# Patient Record
Sex: FEMALE | Race: BLACK OR AFRICAN AMERICAN | NOT HISPANIC OR LATINO | Employment: FULL TIME | URBAN - METROPOLITAN AREA
[De-identification: names, ages, dates, MRNs, and addresses within clinical notes are randomized per-mention and may not be internally consistent; named-entity substitution may affect disease eponyms.]

---

## 2021-10-24 ENCOUNTER — HOSPITAL ENCOUNTER (EMERGENCY)
Facility: CLINIC | Age: 31
Discharge: HOME | End: 2021-10-24
Attending: EMERGENCY MEDICINE | Admitting: EMERGENCY MEDICINE
Payer: OTHER GOVERNMENT

## 2021-10-24 ENCOUNTER — APPOINTMENT (OUTPATIENT)
Dept: CT IMAGING | Facility: CLINIC | Age: 31
End: 2021-10-24
Attending: EMERGENCY MEDICINE
Payer: OTHER GOVERNMENT

## 2021-10-24 VITALS
HEART RATE: 62 BPM | DIASTOLIC BLOOD PRESSURE: 82 MMHG | WEIGHT: 135 LBS | RESPIRATION RATE: 22 BRPM | SYSTOLIC BLOOD PRESSURE: 119 MMHG | TEMPERATURE: 98.2 F | OXYGEN SATURATION: 96 %

## 2021-10-24 DIAGNOSIS — U07.1 INFECTION DUE TO 2019 NOVEL CORONAVIRUS: ICD-10-CM

## 2021-10-24 DIAGNOSIS — R06.00 DYSPNEA, UNSPECIFIED TYPE: ICD-10-CM

## 2021-10-24 LAB
ANION GAP SERPL CALCULATED.3IONS-SCNC: 5 MMOL/L (ref 3–14)
BASOPHILS # BLD MANUAL: 0 10E3/UL (ref 0–0.2)
BASOPHILS NFR BLD MANUAL: 0 %
BUN SERPL-MCNC: 9 MG/DL (ref 7–30)
CALCIUM SERPL-MCNC: 9.3 MG/DL (ref 8.5–10.1)
CHLORIDE BLD-SCNC: 110 MMOL/L (ref 94–109)
CO2 SERPL-SCNC: 27 MMOL/L (ref 20–32)
CREAT SERPL-MCNC: 0.85 MG/DL (ref 0.52–1.04)
D DIMER PPP FEU-MCNC: 0.63 UG/ML FEU (ref 0–0.5)
EOSINOPHIL # BLD MANUAL: 0 10E3/UL (ref 0–0.7)
EOSINOPHIL NFR BLD MANUAL: 0 %
ERYTHROCYTE [DISTWIDTH] IN BLOOD BY AUTOMATED COUNT: 14.3 % (ref 10–15)
GFR SERPL CREATININE-BSD FRML MDRD: 90 ML/MIN/1.73M2
GLUCOSE BLD-MCNC: 82 MG/DL (ref 70–99)
HCT VFR BLD AUTO: 37.2 % (ref 35–47)
HGB BLD-MCNC: 12.6 G/DL (ref 11.7–15.7)
LYMPHOCYTES # BLD MANUAL: 1.7 10E3/UL (ref 0.8–5.3)
LYMPHOCYTES NFR BLD MANUAL: 62 %
MCH RBC QN AUTO: 27.2 PG (ref 26.5–33)
MCHC RBC AUTO-ENTMCNC: 33.9 G/DL (ref 31.5–36.5)
MCV RBC AUTO: 80 FL (ref 78–100)
MONOCYTES # BLD MANUAL: 0.1 10E3/UL (ref 0–1.3)
MONOCYTES NFR BLD MANUAL: 3 %
NEUTROPHILS # BLD MANUAL: 1 10E3/UL (ref 1.6–8.3)
NEUTROPHILS NFR BLD MANUAL: 35 %
PLAT MORPH BLD: ABNORMAL
PLATELET # BLD AUTO: 187 10E3/UL (ref 150–450)
POTASSIUM BLD-SCNC: 3.6 MMOL/L (ref 3.4–5.3)
RBC # BLD AUTO: 4.63 10E6/UL (ref 3.8–5.2)
RBC MORPH BLD: ABNORMAL
SODIUM SERPL-SCNC: 142 MMOL/L (ref 133–144)
TARGETS BLD QL SMEAR: ABNORMAL
WBC # BLD AUTO: 2.8 10E3/UL (ref 4–11)

## 2021-10-24 PROCEDURE — 99285 EMERGENCY DEPT VISIT HI MDM: CPT | Mod: 25

## 2021-10-24 PROCEDURE — 85027 COMPLETE CBC AUTOMATED: CPT | Performed by: EMERGENCY MEDICINE

## 2021-10-24 PROCEDURE — 250N000009 HC RX 250: Performed by: EMERGENCY MEDICINE

## 2021-10-24 PROCEDURE — 36415 COLL VENOUS BLD VENIPUNCTURE: CPT | Performed by: EMERGENCY MEDICINE

## 2021-10-24 PROCEDURE — 71275 CT ANGIOGRAPHY CHEST: CPT

## 2021-10-24 PROCEDURE — 85379 FIBRIN DEGRADATION QUANT: CPT | Performed by: EMERGENCY MEDICINE

## 2021-10-24 PROCEDURE — 250N000011 HC RX IP 250 OP 636: Performed by: EMERGENCY MEDICINE

## 2021-10-24 PROCEDURE — 80048 BASIC METABOLIC PNL TOTAL CA: CPT | Performed by: EMERGENCY MEDICINE

## 2021-10-24 RX ORDER — IOPAMIDOL 755 MG/ML
500 INJECTION, SOLUTION INTRAVASCULAR ONCE
Status: COMPLETED | OUTPATIENT
Start: 2021-10-24 | End: 2021-10-24

## 2021-10-24 RX ADMIN — IOPAMIDOL 93 ML: 755 INJECTION, SOLUTION INTRAVENOUS at 15:35

## 2021-10-24 RX ADMIN — SODIUM CHLORIDE 77 ML: 9 INJECTION, SOLUTION INTRAVENOUS at 15:35

## 2021-10-24 ASSESSMENT — ENCOUNTER SYMPTOMS
VOMITING: 0
NAUSEA: 0
SHORTNESS OF BREATH: 1
DIARRHEA: 0

## 2021-10-24 NOTE — ED TRIAGE NOTES
A&O x4.  ABC's intact.      Pt arrives with c/o SOB that started Friday- tested positive for COVID.  Was prescribed inhaler every 123 hours.

## 2021-10-24 NOTE — ED NOTES
Patient eligible for discharge. Patient education including medication administration and follow-up care completed. Patient/representative verbalized understanding of education. Patient/representative able to preform teach back education. All questions answered and concerns addressed. IV removed. All belongings sent home with patient/representative. Patient vitally stable at time of discharge.

## 2021-10-24 NOTE — ED PROVIDER NOTES
History   Chief Complaint:  COVID-19 Concern     The history is provided by the patient.      Radha Bojorquez is a 31 year old female with a history of traumatic pneumothorax, anemia, and depression who presents for evaluation of shortness of breath in the setting of known COVID-19 infection. The patient states that her initial symptoms started on 10/16/21 while she was in [place]. Over the next few days, she lost her sense of taste and smell prompting [place] [facility] [place] and COVID-19 testing that returned positive. She was prescribed an inhaler to use every 1-3 hours. She started to feel shortness of breath on Friday which has worsened over the past two days. She denies nausea, vomiting, or diarrhea.     Review of Systems   HENT:        Loss of taste and smell   Respiratory: Positive for shortness of breath.    Gastrointestinal: Negative for diarrhea, nausea and vomiting.   All other systems reviewed and are negative.    Allergies:  The patient has no known allergies.     Medications:  Ferrous Sulfate  Vitamin D     Past Medical History:    Depression  Postpartum depression  High risk pregnancy  Anemia   Hemoglobin C trait   Umbilical hernia without obstruction  Pneumothorax, traumatic  Kidney infection  UTI      Past Surgical History:    Hernia repair     Family History:    Hypertension - father  Migraines - mother   Sickle cell anemia - daughter    Social History:  Presents to the ED: alone  Current Some Day Smoker    Physical Exam     Patient Vitals for the past 24 hrs:   BP Temp Temp src Pulse Resp SpO2 Weight   10/24/21 1600 119/82 -- -- 62 -- 96 % --   10/24/21 1552 -- -- -- -- -- -- 61.2 kg (135 lb)   10/24/21 1530 -- -- -- -- -- 100 % --   10/24/21 1515 (!) 128/94 -- -- 57 -- 100 % --   10/24/21 1514 -- -- -- -- -- 100 % --   10/24/21 1500 (!) 122/107 -- -- 71 -- 100 % --   10/24/21 1445 (!) 124/102 -- -- 65 -- 100 % --   10/24/21 1430 124/82 -- -- 63 -- 100 % --   10/24/21 1415 105/74 -- -- -- -- -- --    10/24/21 1349 124/85 98.2  F (36.8  C) Temporal 74 22 100 % --       Physical Exam  Constitutional: Vital signs reviewed as above.   Head: No external signs of trauma noted.  Eyes: Pupils are equal, round, and reactive to light.   Neck: No JVD noted  Cardiovascular: Normal rate, regular rhythm and normal heart sounds.  No murmur heard. Equal B/L peripheral pulses.  Pulmonary/Chest: Effort normal and breath sounds normal. No respiratory distress. Patient has no wheezes. Patient has no rales.   Gastrointestinal: Soft. There is no tenderness.   Musculoskeletal/Extremities: No edema noted. Normal tone.  Neurological: Patient is alert and oriented to person, place, and time.   Skin: Skin is warm and dry. There is no diaphoresis noted.   Psychiatric: The patient appears calm.    Emergency Department Course     Imaging:  CT Chest Pulmonary Embolism with Contrast  1. No pulmonary emboli. 2. Mild groundglass opacities in the lower lobes bilaterally consistent with pneumonia.     Report per radiology    Laboratory:  CBC: WBC: 2.8 (L), HGB: 12.6, PLT: 187    BMP: Chloride: 110 (H), o/w WNL (Creatinine: 0.85, Glucose 82,)    D-dimer: 0.63 (H)    Emergency Department Course:    Reviewed:  I reviewed the patient's nursing notes, vitals and past medical history.     Assessments/Consults:    ED Course as of Oct 24 2318   Sun Oct 24, 2021   1420 I performed an exam of the patient in room ED08 as documented above.      1600 Rechecked and updated. Discharge instructions and reasons to return discussed.        Disposition:  The patient was discharged to home.     Impression & Plan     CMS Diagnoses: None    Medical Decision Making:  Radha Bojorquez is a 31 year old female who presents to the emergency department today for evaluation of shortness of breath.  Please see the HPI and exam for specifics.  Fortunately, the patient was not hypoxic.  Based on her recent travel, blood work including D-dimer was ordered.  This led to a CT  chest which fortunately, did not demonstrate PE though there are findings consistent with COVID-19.  The patient was ultimately able to be discharged with instructions for outpatient follow-up as well as a pulse oximeter.  She should return with any new or worsening symptoms.  Anticipatory guidance given prior to discharge.        Covid-19  Radha Bojorquez was evaluated during a global COVID-19 pandemic, which necessitated consideration that the patient might be at risk for infection with the SARS-CoV-2 virus that causes COVID-19.   Applicable protocols for evaluation were followed during the patient's care.   COVID-19 was considered as part of the patient's evaluation. The plan for testing is:  a test was obtained at a previous visit which was reviewed & considered today.    Diagnosis:    ICD-10-CM    1. Dyspnea, unspecified type  R06.00    2. Infection due to 2019 novel coronavirus  U07.1 COVID-19 [facility] Referral     Care Coordination Referral     Scribe Disclosure:  I, Anita Quintana, am serving as a scribe at 2:10 PM on 10/24/2021 to document services personally performed by Abilio Jose DO based on my observations and the provider's statements to me.    This note was completed in part using Dragon voice recognition software. Although reviewed after completion, some word and grammatical errors may occur.      Abilio Jose DO  10/24/21 [id number]

## 2021-10-25 ENCOUNTER — PATIENT OUTREACH (OUTPATIENT)
Dept: CARE COORDINATION | Facility: CLINIC | Age: 31
End: 2021-10-25

## 2021-10-25 NOTE — PROGRESS NOTES
"Care Coordination Hospital/ED Discharge Follow up Note    Hospital/ED Discharge date: 10/24/21    Reason/Diagnosis for Hospital/ED visit: COVID-19, SOB    Are you feeling better, the same, or worse since your Hospital/ED visit? unchanged    Symptoms:     Cough - Coughs more with moving around, and now experiencing pain in her throat when coughing, which she didn't have previously.  RN recommended Mucinex to help thin/loosen secretions, honey.    Shortness of breath:  shortness of breath with activity.  She has been using the IS, nurse set it to 1000 and pt states she's been able to reach that number.  Pt states she has been taking Pulmicort flexhaler twice a day, as prescribed from [facility] [place] Friday, 10/22/21, when she was seen for CP.  RN encouraged continued use of her IS every 1-2 hours WA, as well as the Pulmicort inhaler.    Chest pain:  Yes: \"a little, the same as it was yesterday when seen in the ER, but worse than it was when seen at [facility] on Friday.\"      Pt states she has a history of anxiety with chest pain, which was pre-COVID, and with now having COVID, she believes there is a \"fear factor\" and having some difficulty determining if the chest discomfort is from her anxiety (which she states is always worse when talking about \"something serious\") vs actual worsening CP from her COVID infection.  Pt reports when her anxiety is bad, she shuts the T.V. off, and goes into a quiet place where she can look out her window and do deep breathing exercises, which has worked well in bringing her anxiety under control; she has tried this a few times since being dx with COVID and has found it helpful.  She is concerned about when to be seen in the ER, and how to determine when to be seen.  RN spent a lot of time listening and talking with pt about her anxiety, and ways to help cope when anxiety increases including writing down how she's feeling, keeping a journal, talking about it with someone she trusts (pt states " "she doesn't talk about her anxiety but with a few people at her anxiety gets worse when talking too much about things).  Advised pt continue using the same pre-COVID anxiety strategies she's used with success in the past, and if symptoms persist despite her usual methods to calm her anxiety, and or the chest pain is severe, she should return to the ER/Urgent Care, and or call the nurse triage line to help determine if she should be seen in the ER/UC.  Also advised if her symptoms are concerning/severe, she can always be seen at any time.  Pt states she has the triage nurse number provided by the ER last night, and appreciative of talking through this during call today.  RN also informed pt she can ask for assistance or send messages to the RNs through [employer] Loop as well, which pt has already activated and participating in.  RN also changed the loop from ER to COVID, for closer monitoring over the next 2 weeks.      Fever: No    Current temperature:  98.3 (Forehead scanner)    Headache:  Yes \"slight\"    Sore throat:  No, only with coughing    Nasal congestion:  No    Nausea/vomiting/diarrhea:  Yes, felt nauseous this morning but it has since gone away.    Body aches/joint pains:  No    Fatigue:  No, but having difficulty sleeping.  Took ZzzQuil last night, which helped.  RN informed pt she can try Melatonin as well to help with insomnia.    Home treatment measures used and outcome:  Tylenol Regular Strength 1 tab once a day but last night took 2 ibuprofen instead of the Tylenol as she had a HA.  TheraFlu twice a day (morning & night), ZzzQuil, Vitamin C, Tumeric, Elderberry, and Apple Cider Vinegar.  RN informed pt she can also take Vitamin D and Zinc per the directions on the bottle.  Advised to monitor Tylenol intake, max dose of 3,000 mg per 24 hours.    Pulse Oximeter/Oxygen Questions:    Were you sent home with a pulse oximeter?  Yes    Are you currently utilizing the pulse oximeter?  Yes    Do you understand " how to use the home oximeter?  Yes    What are your current oxygen saturation levels?  lowest reading has been 90%, highest reading 98% a few minutes ago    Oxygen saturation levels in ED/IP:  %    Were you sent home with home oxygen?  No      Medications:    Were you prescribed any new medications?  No    Follow Up:    Do you have a follow up appointment scheduled with your PCP or specialist?  No, RN transferred pt to .  Pt scheduled for video visit with provider on 10/27/21.    Do you have a plan in place in the event of an emergency?  Yes    If patient is established or planning to establish primary care within [facility], Care Coordination was offered. Care Coordination accepted/declined:  No    GetWell Loop invitation received?  Yes    GetWell Loop invitation accepted, pending patient activation or declined:   Accepted, louis clarke RN changed loop from ER to COVID for closer monitoring          Ria Shen, JULIETTE  [facility] Care Coordination

## 2021-10-26 ENCOUNTER — APPOINTMENT (OUTPATIENT)
Dept: GENERAL RADIOLOGY | Facility: CLINIC | Age: 31
End: 2021-10-26
Attending: EMERGENCY MEDICINE
Payer: OTHER GOVERNMENT

## 2021-10-26 ENCOUNTER — HOSPITAL ENCOUNTER (EMERGENCY)
Facility: CLINIC | Age: 31
Discharge: HOME | End: 2021-10-26
Attending: EMERGENCY MEDICINE | Admitting: EMERGENCY MEDICINE
Payer: OTHER GOVERNMENT

## 2021-10-26 VITALS
SYSTOLIC BLOOD PRESSURE: 109 MMHG | TEMPERATURE: 99.1 F | HEART RATE: 76 BPM | RESPIRATION RATE: 30 BRPM | DIASTOLIC BLOOD PRESSURE: 86 MMHG | OXYGEN SATURATION: 100 %

## 2021-10-26 DIAGNOSIS — R10.13 ABDOMINAL PAIN, EPIGASTRIC: ICD-10-CM

## 2021-10-26 DIAGNOSIS — U07.1 INFECTION DUE TO 2019 NOVEL CORONAVIRUS: ICD-10-CM

## 2021-10-26 LAB
ALBUMIN SERPL-MCNC: 4.1 G/DL (ref 3.4–5)
ALP SERPL-CCNC: 63 U/L (ref 40–150)
ALT SERPL W P-5'-P-CCNC: 15 U/L (ref 0–50)
ANION GAP SERPL CALCULATED.3IONS-SCNC: 7 MMOL/L (ref 3–14)
AST SERPL W P-5'-P-CCNC: 16 U/L (ref 0–45)
BASOPHILS # BLD AUTO: 0 10E3/UL (ref 0–0.2)
BASOPHILS NFR BLD AUTO: 0 %
BILIRUB DIRECT SERPL-MCNC: 0.2 MG/DL (ref 0–0.2)
BILIRUB SERPL-MCNC: 0.7 MG/DL (ref 0.2–1.3)
BUN SERPL-MCNC: 13 MG/DL (ref 7–30)
CALCIUM SERPL-MCNC: 9.2 MG/DL (ref 8.5–10.1)
CHLORIDE BLD-SCNC: 110 MMOL/L (ref 94–109)
CO2 SERPL-SCNC: 24 MMOL/L (ref 20–32)
CREAT SERPL-MCNC: 0.93 MG/DL (ref 0.52–1.04)
EOSINOPHIL # BLD AUTO: 0 10E3/UL (ref 0–0.7)
EOSINOPHIL NFR BLD AUTO: 1 %
ERYTHROCYTE [DISTWIDTH] IN BLOOD BY AUTOMATED COUNT: 14.1 % (ref 10–15)
GFR SERPL CREATININE-BSD FRML MDRD: 81 ML/MIN/1.73M2
GLUCOSE BLD-MCNC: 96 MG/DL (ref 70–99)
HCT VFR BLD AUTO: 37.7 % (ref 35–47)
HGB BLD-MCNC: 12.6 G/DL (ref 11.7–15.7)
HOLD SPECIMEN: NORMAL
IMM GRANULOCYTES # BLD: 0 10E3/UL
IMM GRANULOCYTES NFR BLD: 0 %
LIPASE SERPL-CCNC: 68 U/L (ref 73–393)
LYMPHOCYTES # BLD AUTO: 1.3 10E3/UL (ref 0.8–5.3)
LYMPHOCYTES NFR BLD AUTO: 30 %
MCH RBC QN AUTO: 27.2 PG (ref 26.5–33)
MCHC RBC AUTO-ENTMCNC: 33.4 G/DL (ref 31.5–36.5)
MCV RBC AUTO: 81 FL (ref 78–100)
MONOCYTES # BLD AUTO: 0.2 10E3/UL (ref 0–1.3)
MONOCYTES NFR BLD AUTO: 6 %
NEUTROPHILS # BLD AUTO: 2.7 10E3/UL (ref 1.6–8.3)
NEUTROPHILS NFR BLD AUTO: 63 %
NRBC # BLD AUTO: 0 10E3/UL
NRBC BLD AUTO-RTO: 0 /100
NT-PROBNP SERPL-MCNC: 17 PG/ML (ref 0–450)
PLATELET # BLD AUTO: 192 10E3/UL (ref 150–450)
POTASSIUM BLD-SCNC: 3.5 MMOL/L (ref 3.4–5.3)
PROT SERPL-MCNC: 8.4 G/DL (ref 6.8–8.8)
RBC # BLD AUTO: 4.63 10E6/UL (ref 3.8–5.2)
SODIUM SERPL-SCNC: 141 MMOL/L (ref 133–144)
TROPONIN I SERPL-MCNC: <0.015 UG/L (ref 0–0.04)
WBC # BLD AUTO: 4.2 10E3/UL (ref 4–11)

## 2021-10-26 PROCEDURE — 85025 COMPLETE CBC W/AUTO DIFF WBC: CPT | Performed by: EMERGENCY MEDICINE

## 2021-10-26 PROCEDURE — 250N000009 HC RX 250: Performed by: EMERGENCY MEDICINE

## 2021-10-26 PROCEDURE — 82248 BILIRUBIN DIRECT: CPT | Performed by: EMERGENCY MEDICINE

## 2021-10-26 PROCEDURE — 83880 ASSAY OF NATRIURETIC PEPTIDE: CPT | Performed by: EMERGENCY MEDICINE

## 2021-10-26 PROCEDURE — 83690 ASSAY OF LIPASE: CPT | Performed by: EMERGENCY MEDICINE

## 2021-10-26 PROCEDURE — 36415 COLL VENOUS BLD VENIPUNCTURE: CPT | Performed by: EMERGENCY MEDICINE

## 2021-10-26 PROCEDURE — 99284 EMERGENCY DEPT VISIT MOD MDM: CPT | Mod: 25

## 2021-10-26 PROCEDURE — 80048 BASIC METABOLIC PNL TOTAL CA: CPT | Performed by: EMERGENCY MEDICINE

## 2021-10-26 PROCEDURE — 250N000013 HC RX MED GY IP 250 OP 250 PS 637: Performed by: EMERGENCY MEDICINE

## 2021-10-26 PROCEDURE — 71045 X-RAY EXAM CHEST 1 VIEW: CPT

## 2021-10-26 PROCEDURE — 84484 ASSAY OF TROPONIN QUANT: CPT | Performed by: EMERGENCY MEDICINE

## 2021-10-26 RX ORDER — ONDANSETRON 4 MG/1
4 TABLET, ORALLY DISINTEGRATING ORAL EVERY 6 HOURS PRN
Qty: 12 TABLET | Refills: 0 | Status: SHIPPED | OUTPATIENT
Start: 2021-10-26 | End: 2022-03-14

## 2021-10-26 RX ORDER — PANTOPRAZOLE SODIUM 40 MG/1
40 TABLET, DELAYED RELEASE ORAL DAILY
Qty: 30 TABLET | Refills: 0 | Status: SHIPPED | OUTPATIENT
Start: 2021-10-26 | End: 2023-08-17

## 2021-10-26 RX ORDER — LORAZEPAM 1 MG/1
1 TABLET ORAL 3 TIMES DAILY PRN
Qty: 8 TABLET | Refills: 0 | Status: SHIPPED | OUTPATIENT
Start: 2021-10-26 | End: 2023-08-17

## 2021-10-26 RX ORDER — PROCHLORPERAZINE MALEATE 10 MG
10 TABLET ORAL EVERY 8 HOURS PRN
Qty: 14 TABLET | Refills: 0 | Status: SHIPPED | OUTPATIENT
Start: 2021-10-26 | End: 2022-03-14

## 2021-10-26 RX ADMIN — LIDOCAINE HYDROCHLORIDE 30 ML: 20 SOLUTION ORAL; TOPICAL at 15:16

## 2021-10-26 ASSESSMENT — ENCOUNTER SYMPTOMS
ABDOMINAL PAIN: 1
NAUSEA: 1
VOMITING: 0
SHORTNESS OF BREATH: 0

## 2021-10-26 NOTE — DISCHARGE INSTRUCTIONS
Discharge Instructions  COVID-19    COVID-19 is the disease caused by a new coronavirus. The virus spreads from person-to-person primarily by droplets when an infected person coughs or sneezes and the droplets are then breathed in by another person. There are tests available to diagnose COVID-19. You may have been diagnosed with COVID, may be being tested for COVID and have a pending test result, or may have been exposed to COVID.    Symptoms of COVID-19  Many people have no symptoms or mild symptoms.  Symptoms may usually appear 4 to 5 days (up to 14 days) after contact with a person with COVID-19. Some people will get severe symptoms and pneumonia. Usual symptoms are:     ? Fever  ? Cough  ? Trouble breathing    Less common symptoms are: Headache, body aches, sore throat, sneezing, diarrhea, loss of taste or smell.    Isolation and Quarantine    You may have been seen because you have symptoms, had an exposure, or had some other concern about possible COVID. The best way to stop the spread of the virus is to avoid contact with others.    Isolation refers to sick people staying away from people who are not sick. A person in quarantine is limiting activity because they were exposed and are waiting to see if they might become sick.    If you test positive for COVID, you should stay home (isolation) for at least 10 days after your symptoms began, and for 24 hours with no fever and improvement of symptoms--whichever is longer. (Your fever should be gone for 24 hours without using fever-reducing medicine). If you have no symptoms, you should stay home (isolation) for 10 days from the day of the test. If you have been vaccinated for COVID, the vaccination will not cause you to test positive so a positive test result generally is a  true positive .    For example, if you have a fever and cough for 6 days, you need to stay home 4 more days with no fever for a total of 10 days. Or, if you have a fever and cough  for 10 days, you need to stay home one more day with no fever for a total of 11 days.    If you have a high-risk exposure to COVID (you spent 15 minutes or more within six feet of somebody who has COVID), you should stay home (quarantine) for 14 days, unless you are vaccinated. Even if you test negative for COVID, the CDC recommends a 14-day quarantine from the time of your last exposure to that individual (unless you are vaccinated). There are options for a shortened (<14 day quarantine) you can review at:  [email or web address]./diseases/coronavirus/close.html#long    If you live in the same house as somebody with COVID and cannot separate from them, you will need to quarantine for 14-days after that person's isolation (infectious) period. That means that you may need to quarantine for 24-days after that person became symptomatic/ill.    If you are vaccinated and do not develop symptoms, you do not need to quarantine after exposure.    If you have symptoms but a negative test, you should stay at home until you are symptom-free and without fever for 24 hours, using the same judgment you would for when it is safe to return to work/school from strep throat, influenza, or the common cold. If you worsen, you should consider being re-evaluated.    If you are being tested for COVID because of symptoms and your test is pending, you should stay home until you know your test result.    If I have COVID, how should I protect myself and others?    Do not go to work or school. Have a friend or relative do your shopping. Do not use public transportation (bus, train) or ridesharing (Lyft, Uber).    Separate yourself from other people in your home. As much as possible, you should stay in one room and away from other people in your home. Also, use a separate bathroom, if possible. Avoid handling pets or other animals while sick.     Wear a facemask if you need to be around other people and cover your mouth and nose with a  tissue when you cough or sneeze.     Avoid sharing personal household items. You should not share dishes, drinking glasses, forks/knives/spoons, towels, or bedding with other people in your home. After using these items, they should be washed with soap and water. Clean parts of your home that are touched often (doorknobs, faucets, countertops, etc.) daily.     Wash your hands often with soap and water for at least 20 seconds or use an alcohol-based hand  containing at least 60% alcohol.     Avoid touching your face.    Treat your symptoms. You can take Acetaminophen (Tylenol) to treat body aches and fever as needed for comfort. Ibuprofen (Advil or Motrin) can be used as well if you still have symptoms after taking Tylenol. Drink fluids. Rest.    Watch for worsening symptoms such as shortness of breath/difficulty breathing or very severe weakness.    Employers/workplaces are being asked by the Centers for Disease Control (CDC) to not request notes/documentation for you to return to work or prove that you were ill. You may choose to show your employer this paperwork. Also, repeat testing should not be required to return to work.    Exercise/Sports in rare cases, COVID could affect your heart in a way that makes exercise or participation in sports dangerous.    If you have a mild COVID illness (fever, cough, sore throat, and similar symptoms but no difficulty breathing or abnormalities of the lung): After your COVID symptoms have resolved, wait 14-days before returning to activity.  If you have more than a mild illness (meaning that you have problems with your breathing or lungs) or if you participate in competitive or strenuous activity or have a history of heart disease: Please see your primary doctor/provider prior to return to activity/competition.    Antibody treatments are available for patients with mild to moderate COVID illness in order to prevent severe illness. In general, only patients with risk  factors for severe illness are eligible for treatment. For more information, to see if you are eligible, and to find treatment, go to the [facility]:  [email or web address]/[email or web address].html     Return to the Emergency Department if:    If you are developing worsening breathing, shortness of breath, or feel worse you should seek medical attention.  If you are uncertain, contact your health care provider/clinic. If you need emergency medical attention, call 911 and tell them you have been ill.

## 2021-10-26 NOTE — ED PROVIDER NOTES
History   Chief Complaint:  Shortness of Breath    The history is provided by the patient.     Radha Bojorquez is a 31 year old female with history of anemia and pneumothorax who presents for evaluation of shortness of breath. The patient was seen here 2 days ago after being COVID positive as of 8 days ago. She was evaluated by shortness of breath. She had the below workup performed and was sent home after being enrolled in the get well loop.     She returns today because she was checking her O2 sats at home with the pulse ox given to her from the get well loop program and it read as 80%. She reports that earlier today she had some new abdominal pain and was still having her shortness of breath. She called her get well loop representative because of this and then took her pulse ox as above. She states she did drop the pulse ox at home and she is worried this may have effected the reading. She states she does not feel short of breath at this time but was concerned for the low O2 reading. She states her anxiety has been feeling increased today and she notes that she is having a hard time differentiating the anxiety from her symptoms. She rates her abdominal pain as a 5/10 at this time and states it feels different form her typical hernia pain. She notes some mild nausea while in the ER today but nothing at home. She denies any vomiting and any other known symptoms or concerns at this time. It is of note that pulse Ox at home reading 80% while on monitor in the ED she is reading 100%.     Workup from 10/24/21 at [facility] ED:  CT Chest Pulmonary Embolism with Contrast  1. No pulmonary emboli. 2. Mild groundglass opacities in the lower lobes bilaterally consistent with pneumonia. Report per radiology     CBC: WBC: 2.8 (L), HGB: 12.6, PLT: 187  BMP: Chloride: 110 (H), o/w WNL (Creatinine: 0.85, Glucose 82,)  D-dimer: 0.63 (H)    Review of Systems   Respiratory: Negative for shortness of breath.    Cardiovascular: Positive  for chest pain.   Gastrointestinal: Positive for abdominal pain and nausea. Negative for vomiting.   All other systems reviewed and are negative.      Allergies:  The patient has no known allergies.     Medications:  Ferrous Sulfate  Vitamin D     Past Medical History:    Depression  Postpartum depression  High risk pregnancy  Anemia   Hemoglobin C trait   Umbilical hernia without obstruction  Pneumothorax, traumatic  Kidney infection  UTI      Past Surgical History:    Hernia repair      Family History:    Hypertension   Migraines    Social History:  The patient presents to the ED alone.  Tobacco Use: Yes  Alcohol Use: No  Drug Use: No    Physical Exam     Patient Vitals for the past 24 hrs:   BP Temp Temp src Pulse Resp SpO2   10/26/21 1515 116/85 -- -- 72 -- 97 %   10/26/21 1500 (!) 107/91 -- -- 76 -- 100 %   10/26/21 1445 119/84 -- -- 77 -- 96 %   10/26/21 1311 116/79 99.1  F (37.3  C) Temporal 95 30 100 %       Physical Exam  Constitutional: Well developed, nontox appearance  Head: Atraumatic.   Neck:  no stridor  Eyes: no scleral icterus  Cardiovascular: RRR, 2+ bilat radial pulses  Pulmonary/Chest: intermittently coughing, nml resp effort, Clear BS bilat   Abdominal: ND, soft, epigastric abd tendernes, no rebound or guarding   Ext: Warm, well perfused, no edema  Neurological: A&O, symmetric facies, moves ext x4  Skin: Skin is warm and dry.   Psychiatric: Behavior is normal. Thought content normal.   Nursing note and vitals reviewed.    Emergency Department Course   Imaging:  XR Chest Port 1 View:  No acute disease. Report per radiology     Laboratory:  CBC: WBC 4.2, HGB 12.6,     BMP: Cl 110 (H), o/w WNL (Creat 0.93)    HFP: Pending at sign out    BNP: 17    Troponin: <0.015     Lipase: Pending at sign out    Emergency Department Course:  Reviewed:  I reviewed nursing notes, vitals, past medical history and care everywhere    Assessments:  1434 I performed a physical exam of the patient. Findings as  above.     1610 Patient rechecked and updated. Plan of care discussed and questions answered.     Interventions:  1516 Maalox 30 mL oral    Disposition:  The patient was signed out to  pending labs.     Impression & Plan   Covid-19  Radha Bojorquez was evaluated during a global COVID-19 pandemic, which necessitated consideration that the patient might be at risk for infection with the SARS-CoV-2 virus that causes COVID-19.   Applicable protocols for evaluation were followed during the patient's care.    Medical Decision Makin year old female presenting w/ cough, known Covid, epigastric abdominal pain    DDx includes viral syndrome NOS, influenza-like illness, influenza, COVID-19, hepatitis, pancreatitis, gastritis, GERD.  Doubt ACS, vascular catastrophe such as AAA or dissection given history and physical exam.  Labs significant for no remarkable abnormality, COVID-19 test not repeated given known positive.  Imaging sig for no acute cardiopulmonary disease although CT from 10/24/2021 reviewed revealed no PE at that time.  Interventions as noted above with improvement after GI cocktail.  Given relatively reassuring abdominal exam and mild improvement with GI cocktail, do not feel advanced imaging of the abdomen is indicated at this time.  Doubt hollow viscus perforation, intra-abdominal infection or surgical etiology.  Patient is likely experiencing gastritis and nausea secondary to Covid.  Prescriptions written as noted below for symptom control.  Patient's pulse ox was noted to be normal in the emergency department.  It was compared to her home pulse ox which read falsely low O2 saturations.  She was given a new pulse oximeter.  At this time I feel the pt is safe for discharge.  Recommendations given regarding follow up with PCP and return to the emergency department as needed for new or worsening symptoms.  Pt counseled on all results, disposition and diagnosis.  They are understanding and agreeable  to plan. Patient discharged in stable condition.      Diagnosis:    ICD-10-CM    1. Infection due to 2019 novel coronavirus  U07.1    2. Abdominal pain, epigastric  R10.13        Discharge Medications:  New Prescriptions    No medications on file     Scribe Disclosure:  I, [place] Teresa, am serving as a scribe at 1:59 PM on 10/26/2021 to document services personally performed by Eben Massey MD based on my observations and the provider's statements to me.     [facility]         Eben Massey MD  10/26/21 [id number]

## 2021-10-27 ENCOUNTER — VIRTUAL VISIT (OUTPATIENT)
Dept: INTERNAL MEDICINE | Facility: CLINIC | Age: 31
End: 2021-10-27
Payer: OTHER GOVERNMENT

## 2021-10-27 DIAGNOSIS — U07.1 INFECTION DUE TO 2019 NOVEL CORONAVIRUS: Primary | ICD-10-CM

## 2021-10-27 PROCEDURE — 99203 OFFICE O/P NEW LOW 30 MIN: CPT | Mod: 95 | Performed by: NURSE PRACTITIONER

## 2021-10-27 NOTE — PROGRESS NOTES
Radha is a 31 year old who is being evaluated via a billable video visit.      How would you like to obtain your AVS? MyChart  If the video visit is dropped, the invitation should be resent by: Text to cell phone: [phone]  Will anyone else be joining your video visit? No  Video Start Time: 1105    Assessment & Plan     Infection due to 2019 novel coronavirus        Monitor O2 sats, RTC if not improving         Tobacco Cessation:   reports that she has been smoking cigarettes. She has never used smokeless tobacco.          Yamile Enriquez NP  [facility]    Devendra Garcia is a 31 year old who presents for the following health issues     HPI     ED/UC Followup:    Facility:  [facility] ED  Date of visit: 10/26/21  Reason for visit: Covid  Current Status: O2 sats %, afebrile, no cough, ST.           Review of Systems   CONSTITUTIONAL: NEGATIVE for fever, chills, change in weight  ENT/MOUTH: NEGATIVE for ear, mouth and throat problems  RESP: NEGATIVE for significant cough or SOB  CV: NEGATIVE for chest pain, palpitations or peripheral edema      Objective           Vitals:  No vitals were obtained today due to virtual visit.    Physical Exam   GENERAL: Healthy, alert and no distress  EYES: Eyes grossly normal to inspection.  No discharge or erythema, or obvious scleral/conjunctival abnormalities.  RESP: No audible wheeze, cough, or visible cyanosis.  No visible retractions or increased work of breathing.    SKIN: Visible skin clear. No significant rash, abnormal pigmentation or lesions.  NEURO: Cranial nerves grossly intact.  Mentation and speech appropriate for age.  PSYCH: Mentation appears normal, affect normal/bright, judgement and insight intact, normal speech and appearance well-groomed.                Video-Visit Details    Type of service:  Video Visit    Video End Time:1112    Originating Location (pt. Location): Home    Distant Location (provider location):  [facility]  [facility]     Platform used for Video Visit: Alexandra

## 2021-12-12 ENCOUNTER — HEALTH MAINTENANCE LETTER (OUTPATIENT)
Age: 31
End: 2021-12-12

## 2022-03-14 ENCOUNTER — OFFICE VISIT (OUTPATIENT)
Dept: URGENT CARE | Facility: URGENT CARE | Age: 32
End: 2022-03-14

## 2022-03-14 VITALS
OXYGEN SATURATION: 100 % | DIASTOLIC BLOOD PRESSURE: 74 MMHG | HEART RATE: 78 BPM | WEIGHT: 133 LBS | SYSTOLIC BLOOD PRESSURE: 114 MMHG | TEMPERATURE: 98.6 F

## 2022-03-14 DIAGNOSIS — R31.9 URINARY TRACT INFECTION WITH HEMATURIA, SITE UNSPECIFIED: ICD-10-CM

## 2022-03-14 DIAGNOSIS — N91.2 AMENORRHEA: Primary | ICD-10-CM

## 2022-03-14 DIAGNOSIS — Z32.01 PREGNANCY TEST POSITIVE: ICD-10-CM

## 2022-03-14 DIAGNOSIS — N39.0 URINARY TRACT INFECTION WITH HEMATURIA, SITE UNSPECIFIED: ICD-10-CM

## 2022-03-14 DIAGNOSIS — R11.0 NAUSEA: ICD-10-CM

## 2022-03-14 LAB
ALBUMIN UR-MCNC: NEGATIVE MG/DL
APPEARANCE UR: CLEAR
BACTERIA #/AREA URNS HPF: ABNORMAL /HPF
BILIRUB UR QL STRIP: NEGATIVE
COLOR UR AUTO: YELLOW
GLUCOSE UR STRIP-MCNC: NEGATIVE MG/DL
HCG UR QL: POSITIVE
HGB UR QL STRIP: ABNORMAL
KETONES UR STRIP-MCNC: 40 MG/DL
LEUKOCYTE ESTERASE UR QL STRIP: ABNORMAL
NITRATE UR QL: POSITIVE
PH UR STRIP: 5.5 [PH] (ref 5–7)
RBC #/AREA URNS AUTO: ABNORMAL /HPF
SP GR UR STRIP: 1.02 (ref 1–1.03)
UROBILINOGEN UR STRIP-ACNC: 0.2 E.U./DL
WBC #/AREA URNS AUTO: ABNORMAL /HPF

## 2022-03-14 PROCEDURE — 87086 URINE CULTURE/COLONY COUNT: CPT | Performed by: PHYSICIAN ASSISTANT

## 2022-03-14 PROCEDURE — 81001 URINALYSIS AUTO W/SCOPE: CPT | Performed by: PHYSICIAN ASSISTANT

## 2022-03-14 PROCEDURE — 81025 URINE PREGNANCY TEST: CPT | Performed by: PHYSICIAN ASSISTANT

## 2022-03-14 PROCEDURE — 99214 OFFICE O/P EST MOD 30 MIN: CPT | Performed by: PHYSICIAN ASSISTANT

## 2022-03-14 RX ORDER — NITROFURANTOIN 25; 75 MG/1; MG/1
100 CAPSULE ORAL 2 TIMES DAILY
Qty: 14 CAPSULE | Refills: 0 | Status: SHIPPED | OUTPATIENT
Start: 2022-03-14 | End: 2023-08-17

## 2022-03-14 NOTE — PROGRESS NOTES
Assessment & Plan     Amenorrhea  Urine HCG is POSITIVE  Patients last period was in mid feb  Pregnancy is fairly early along  - UA Macro with Reflex to Micro and Culture - lab collect  - HCG qualitative urine  - Urine Microscopic Exam  - Urine Culture    Pregnancy test positive  CG qualitative urine     Status: Abnormal   Result Value Ref Range    hCG Urine Qualitative Positive (A) Negative   Urine Microscopic Exam     Status: Abnormal     Advised to follow up with OB  Start prenatal vitamins    Urinary tract infection with hematuria, site unspecified  UA positive for UTI  Urine culture pending  Start macrobid  Increase oral fluids  Follow up with PCP as needed  - nitroFURantoin macrocrystal-monohydrate (MACROBID) 100 MG capsule; Take 1 capsule (100 mg) by mouth 2 times daily    Nausea  Secondary to New onset pregnancy  OB does not advise using zofran for nausea  Recommended to start her on diphenhydramine for nausea  - diphenhydrAMINE (BENADRYL) 25 MG tablet; Take 1 tablet (25 mg) by mouth 4 times daily as needed (nausea)       Tobacco Cessation:   reports that she has been smoking cigarettes. She has never used smokeless tobacco.    No follow-ups on file.    Matthew Palacio PA-C  [facility]    Results for orders placed or performed in visit on 03/14/22   UA Macro with Reflex to Micro and Culture - lab collect     Status: Abnormal    Specimen: Urine, Midstream   Result Value Ref Range    Color Urine Yellow Colorless, Straw, Light Yellow, Yellow    Appearance Urine Clear Clear    Glucose Urine Negative Negative mg/dL    Bilirubin Urine Negative Negative    Ketones Urine 40  (A) Negative mg/dL    Specific Gravity Urine 1.025 1.003 - 1.035    Blood Urine Small (A) Negative    pH Urine 5.5 5.0 - 7.0    Protein Albumin Urine Negative Negative mg/dL    Urobilinogen Urine 0.2 0.2, 1.0 E.U./dL    Nitrite Urine Positive (A) Negative    Leukocyte Esterase Urine Small (A) Negative   HCG  qualitative urine     Status: Abnormal   Result Value Ref Range    hCG Urine Qualitative Positive (A) Negative   Urine Microscopic Exam     Status: Abnormal   Result Value Ref Range    Bacteria Urine Many (A) None Seen /HPF    RBC Urine 2-5 (A) 0-2 /HPF /HPF    WBC Urine 25-50 (A) 0-5 /HPF /HPF       Subjective   Radha is a 31 year old who presents for the following health issues     HPI     Patient has been nauseated everyday for the past week and a half. She has taken 2 pregnancy test. One was positive the other was NEG.     Review of Systems   Constitutional, HEENT, cardiovascular, pulmonary, gi and gu systems are negative, except as otherwise noted.      Objective    /74 (BP Location: Right arm, Patient Position: Sitting, Cuff Size: Adult Regular)   Pulse 78   Temp 98.6  F (37  C) (Tympanic)   Wt 60.3 kg (133 lb)   SpO2 100%   There is no height or weight on file to calculate BMI.  Physical Exam   GENERAL: healthy, alert and no distress  RESP: lungs clear to auscultation - no rales, rhonchi or wheezes  CV: regular rate and rhythm, normal S1 S2, no S3 or S4, no murmur, click or rub, no peripheral edema and peripheral pulses strong  ABDOMEN: soft, nontender, no hepatosplenomegaly, no masses and bowel sounds normal  NEURO: Normal strength and tone, mentation intact and speech normal  PSYCH: mentation appears normal, affect normal/bright

## 2022-03-14 NOTE — PATIENT INSTRUCTIONS

## 2022-03-16 LAB — BACTERIA UR CULT: NORMAL

## 2022-10-03 ENCOUNTER — HEALTH MAINTENANCE LETTER (OUTPATIENT)
Age: 32
End: 2022-10-03

## 2023-02-11 ENCOUNTER — HEALTH MAINTENANCE LETTER (OUTPATIENT)
Age: 33
End: 2023-02-11

## 2023-08-13 ENCOUNTER — ANCILLARY PROCEDURE (OUTPATIENT)
Dept: GENERAL RADIOLOGY | Facility: CLINIC | Age: 33
End: 2023-08-13
Attending: PHYSICIAN ASSISTANT
Payer: COMMERCIAL

## 2023-08-13 ENCOUNTER — OFFICE VISIT (OUTPATIENT)
Dept: URGENT CARE | Facility: URGENT CARE | Age: 33
End: 2023-08-13
Payer: COMMERCIAL

## 2023-08-13 VITALS
WEIGHT: 147 LBS | TEMPERATURE: 98.1 F | DIASTOLIC BLOOD PRESSURE: 80 MMHG | SYSTOLIC BLOOD PRESSURE: 122 MMHG | HEART RATE: 80 BPM | OXYGEN SATURATION: 95 %

## 2023-08-13 DIAGNOSIS — S39.012A LUMBOSACRAL STRAIN, INITIAL ENCOUNTER: Primary | ICD-10-CM

## 2023-08-13 PROCEDURE — 99214 OFFICE O/P EST MOD 30 MIN: CPT | Performed by: PHYSICIAN ASSISTANT

## 2023-08-13 PROCEDURE — 72100 X-RAY EXAM L-S SPINE 2/3 VWS: CPT | Mod: TC | Performed by: RADIOLOGY

## 2023-08-13 ASSESSMENT — ENCOUNTER SYMPTOMS
FREQUENCY: 0
COUGH: 0
FEVER: 0
DYSURIA: 0
ABDOMINAL PAIN: 1
NECK STIFFNESS: 0
CONSTIPATION: 0
SORE THROAT: 0
VOMITING: 0
NAUSEA: 0
SHORTNESS OF BREATH: 0
DIARRHEA: 0
BACK PAIN: 1
FLANK PAIN: 0
CHILLS: 0

## 2023-08-13 NOTE — LETTER
August 13, 2023      Radha Bojorquez  [address] [address]        To Whom It May Concern:    Radha Bojorquez was seen in our clinic. She may return to work on 8/14/23.      Sincerely,        SANDRINE Rowe

## 2023-08-13 NOTE — PROGRESS NOTES
SUBJECTIVE:   Radha Bojorquez is a 33 year old female presenting with a chief complaint of   Chief Complaint   Patient presents with    Urgent Care     Back and abdominal pain, spreading to mid neck        She is a new patient of [facility].    Back and abdominal pain x weeks   - Low back pain - chronic since first pregnancy in 2006, feels it is worsened recently [place] when she having to lift more with lifting baggage. Radiating up to her neck in the back. Has been taking ibuprofen (200 mg every 4 hours) and using heat. Wanting advice on what she can do to avoid this from continually flaring up.   - Abdominal - Believes the abdominal pain is related to her inguinal hernias. Has had these repaired before, but they came back with her pregnancy, has been advised to have the surgery again but has not done it.   - Was seen in the ED 2 weeks ago because it was similar to how bad it was yesterday. Was prescribed flexeril at that time and did not like how she felt taking this.     Review of Systems   Constitutional:  Negative for chills and fever.   HENT:  Negative for congestion and sore throat.    Respiratory:  Negative for cough and shortness of breath.    Cardiovascular:  Negative for chest pain.   Gastrointestinal:  Positive for abdominal pain. Negative for constipation, diarrhea, nausea and vomiting.   Genitourinary:  Negative for dysuria, flank pain, frequency and urgency.   Musculoskeletal:  Positive for back pain. Negative for neck stiffness.   Skin:  Negative for rash.       No past medical history on file.  No family history on file.  Current Outpatient Medications   Medication Sig Dispense Refill    diclofenac (VOLTAREN) 1 % topical gel Apply 2 g topically 4 times daily 50 g 0    COMPOUNDED NON-CONTROLLED SUBSTANCE (CMPD RX) - PHARMACY TO MIX COMPOUNDED MEDICATION Please compound viscous lidocaine 2% and maalox in a 1:1 ratio    Please take 15 to 30 ml by mouth every 4-6 hours as needed  for abdominal pain (Patient not taking: Reported on 10/27/2021) 500 mL 0    diphenhydrAMINE (BENADRYL) 25 MG tablet Take 1 tablet (25 mg) by mouth 4 times daily as needed (nausea) (Patient not taking: Reported on 8/13/2023) 30 tablet 0    LORazepam (ATIVAN) 1 MG tablet Take 1 tablet (1 mg) by mouth 3 times daily as needed for anxiety (Patient not taking: Reported on 10/27/2021) 8 tablet 0    nitroFURantoin macrocrystal-monohydrate (MACROBID) 100 MG capsule Take 1 capsule (100 mg) by mouth 2 times daily (Patient not taking: Reported on 8/13/2023) 14 capsule 0    pantoprazole (PROTONIX) 40 MG EC tablet Take 1 tablet (40 mg) by mouth daily (Patient not taking: Reported on 10/27/2021) 30 tablet 0     Social History     Tobacco Use    Smoking status: Every Day     Types: Cigarettes    Smokeless tobacco: Never   Substance Use Topics    Alcohol use: Not Currently       OBJECTIVE  /80   Pulse 80   Temp 98.1  F (36.7  C)   Wt 66.7 kg (147 lb)   SpO2 95%     Physical Exam  Constitutional:       General: She is not in acute distress.     Appearance: Normal appearance. She is not ill-appearing, toxic-appearing or diaphoretic.   Eyes:      General: No scleral icterus.        Right eye: No discharge.         Left eye: No discharge.      Conjunctiva/sclera: Conjunctivae normal.   Cardiovascular:      Rate and Rhythm: Normal rate and regular rhythm.      Heart sounds: Normal heart sounds. No murmur heard.     No friction rub. No gallop.   Pulmonary:      Effort: Pulmonary effort is normal. No respiratory distress.      Breath sounds: Normal breath sounds. No stridor. No wheezing, rhonchi or rales.   Chest:      Chest wall: No tenderness.   Abdominal:      General: There is no distension.      Palpations: There is no mass.      Tenderness: There is no abdominal tenderness. There is no right CVA tenderness, left CVA tenderness, guarding or rebound.      Hernia: No hernia is present.   Musculoskeletal:         General:  Tenderness present. Normal range of motion.      Cervical back: Normal range of motion. Tenderness present. No rigidity.      Comments: Tenderness to palpation bilaterally over lower back. No spinal tenderness or paraspinal tenderness.    Lymphadenopathy:      Cervical: No cervical adenopathy.   Skin:     General: Skin is warm and dry.   Neurological:      Mental Status: She is alert.   Psychiatric:         Mood and Affect: Mood normal.         Behavior: Behavior normal.         Labs:  Results for orders placed or performed in visit on 08/13/23 (from the past 24 hour(s))   XR Lumbar Spine 2/3 Views    Narrative    EXAM: XR LUMBAR SPINE 2/3 VIEWS  LOCATION: [facility]RG[facility]  DATE: 08/13/2023    INDICATION: Lumbosacral strain, initial encounter.  COMPARISON: None.      Impression    IMPRESSION: Five lumbar-type vertebrae. Right convex curvature of the lumbar spine centered at L3-L4 that could be positional or volitional. Alignment otherwise normal. Vertebral body heights normal. No evidence for fracture. No significant degenerative   change.         X-Ray was not done.    ASSESSMENT:      ICD-10-CM    1. Lumbosacral strain, initial encounter  S39.012A Physical Therapy Referral     diclofenac (VOLTAREN) 1 % topical gel     XR Lumbar Spine 2/3 Views           Medical Decision Making:    Differential Diagnosis:  Back Pain: myofascial low back strain, lumbosacral strain, and degenerative disc disease    Serious Comorbid Conditions:  Adult:   reviewed    PLAN:  - Ibuprofen 800 mg every 8 hours for low back pain  - Voltaren gel prescribed  - PT referral placed  -note for work      Followup:    If not improving or if condition worsens, follow up with your Primary Care Provider, If not improving or if conditions worsens over the next 12-24 hours, go to the Emergency Department    There are no Patient Instructions on file for this visit.

## 2023-08-17 ENCOUNTER — OFFICE VISIT (OUTPATIENT)
Dept: URGENT CARE | Facility: URGENT CARE | Age: 33
End: 2023-08-17
Payer: COMMERCIAL

## 2023-08-17 VITALS
HEART RATE: 60 BPM | OXYGEN SATURATION: 98 % | RESPIRATION RATE: 21 BRPM | DIASTOLIC BLOOD PRESSURE: 64 MMHG | WEIGHT: 148.5 LBS | SYSTOLIC BLOOD PRESSURE: 113 MMHG | TEMPERATURE: 98.5 F

## 2023-08-17 DIAGNOSIS — N76.0 BACTERIAL VAGINITIS: Primary | ICD-10-CM

## 2023-08-17 DIAGNOSIS — B96.89 BACTERIAL VAGINITIS: Primary | ICD-10-CM

## 2023-08-17 DIAGNOSIS — M54.50 ACUTE BILATERAL LOW BACK PAIN WITHOUT SCIATICA: ICD-10-CM

## 2023-08-17 DIAGNOSIS — K42.9 UMBILICAL HERNIA WITHOUT OBSTRUCTION AND WITHOUT GANGRENE: ICD-10-CM

## 2023-08-17 DIAGNOSIS — R10.30 LOWER ABDOMINAL PAIN: ICD-10-CM

## 2023-08-17 DIAGNOSIS — Z11.3 SCREEN FOR STD (SEXUALLY TRANSMITTED DISEASE): ICD-10-CM

## 2023-08-17 PROBLEM — O09.219 HISTORY OF PRECIPITOUS LABOR AND DELIVERIES, ANTEPARTUM: Status: ACTIVE | Noted: 2018-08-21

## 2023-08-17 PROBLEM — O99.330 TOBACCO SMOKING AFFECTING PREGNANCY, ANTEPARTUM: Status: ACTIVE | Noted: 2018-08-21

## 2023-08-17 PROBLEM — F32.A DEPRESSION: Status: ACTIVE | Noted: 2018-03-01

## 2023-08-17 LAB

## 2023-08-17 PROCEDURE — 87591 N.GONORRHOEAE DNA AMP PROB: CPT | Performed by: NURSE PRACTITIONER

## 2023-08-17 PROCEDURE — 87491 CHLMYD TRACH DNA AMP PROBE: CPT | Performed by: NURSE PRACTITIONER

## 2023-08-17 PROCEDURE — 99214 OFFICE O/P EST MOD 30 MIN: CPT | Mod: 25 | Performed by: NURSE PRACTITIONER

## 2023-08-17 PROCEDURE — 96372 THER/PROPH/DIAG INJ SC/IM: CPT | Performed by: NURSE PRACTITIONER

## 2023-08-17 PROCEDURE — 87210 SMEAR WET MOUNT SALINE/INK: CPT | Performed by: NURSE PRACTITIONER

## 2023-08-17 PROCEDURE — 81003 URINALYSIS AUTO W/O SCOPE: CPT

## 2023-08-17 RX ORDER — METRONIDAZOLE 500 MG/1
500 TABLET ORAL 2 TIMES DAILY
Qty: 14 TABLET | Refills: 0 | Status: SHIPPED | OUTPATIENT
Start: 2023-08-17 | End: 2023-08-24

## 2023-08-17 RX ORDER — METHYLPREDNISOLONE 4 MG
TABLET, DOSE PACK ORAL
Qty: 21 TABLET | Refills: 0 | Status: SHIPPED | OUTPATIENT
Start: 2023-08-17 | End: 2024-10-01

## 2023-08-17 RX ORDER — KETOROLAC TROMETHAMINE 30 MG/ML
30 INJECTION, SOLUTION INTRAMUSCULAR; INTRAVENOUS ONCE
Status: COMPLETED | OUTPATIENT
Start: 2023-08-17 | End: 2023-08-17

## 2023-08-17 RX ADMIN — KETOROLAC TROMETHAMINE 30 MG: 30 INJECTION, SOLUTION INTRAMUSCULAR; INTRAVENOUS at 17:05

## 2023-08-17 NOTE — PROGRESS NOTES
Chief Complaint   Patient presents with    Urgent Care     Present for abdominal pain for off/on a few weeks, possible hernia flare-up.   (Patient request std screening).          ICD-10-CM    1. Bacterial vaginitis  N76.0 metroNIDAZOLE (FLAGYL) 500 MG tablet    B96.89       2. Abdominal pain  R10.9 UA Macroscopic with reflex to Microscopic and Culture     KETOROLAC TROMETHAMINE 15MG     INJECTION INTRAMUSCULAR OR SUB-Q      3. Screen for STD (sexually transmitted disease)  Z11.3 Wet preparation     NEISSERIA GONORRHOEA PCR     CHLAMYDIA TRACHOMATIS PCR      4. Acute bilateral low back pain without sciatica  M54.50 ketorolac (TORADOL) injection 30 mg     KETOROLAC TROMETHAMINE 15MG     INJECTION INTRAMUSCULAR OR SUB-Q     methylPREDNISolone (MEDROL DOSEPAK) 4 MG tablet therapy pack      5. Umbilical hernia without obstruction and without gangrene  K42.9       Patient prefers the oral treatment for bacterial vaginosis and this is sent to her pharmacy.  She is advised to abstain from any sexual intercourse until she has completed this medication.    Patient was given Toradol injection while at the urgent care and this did begin to alleviate her back pain.  She is told not to take any NSAIDs until least 8 hours after this injection so she does not overload her kidneys.  She may take Tylenol at any time and resume NSAIDs after the 8 hours is up.  Suggested she use ice and/or heat.  Work note given to be off for couple days and to return with work restrictions.  Patient already has orders to start physical therapy which she will begin next week.  Return to primary care provider or this urgent care in 2 weeks if symptoms are still occurring.    We discussed potential side effects of methylprednisone and she expressed understanding.  She will take them with food.    Patient's hernia is easily reducible.  She understands she needs to go to the emergency room if she is unable to reduce it.  Recommended she make follow-up  appointment with surgeon to have hernia fixed again.  She is hesitant to do this as she has been told by surgery that if she wants to have more children she should wait to have it repaired.      Results for orders placed or performed in visit on 08/17/23 (from the past 24 hour(s))   UA Macroscopic with reflex to Microscopic and Culture    Specimen: Urine, Clean Catch   Result Value Ref Range    Color Urine Yellow Colorless, Straw, Light Yellow, Yellow    Appearance Urine Clear Clear    Glucose Urine Negative Negative mg/dL    Bilirubin Urine Negative Negative    Ketones Urine Negative Negative mg/dL    Specific Gravity Urine 1.025 1.003 - 1.035    Blood Urine Negative Negative    pH Urine 6.0 5.0 - 7.0    Protein Albumin Urine Negative Negative mg/dL    Urobilinogen Urine 0.2 0.2, 1.0 E.U./dL    Nitrite Urine Negative Negative    Leukocyte Esterase Urine Negative Negative    Narrative    Microscopic not indicated   Wet preparation    Specimen: Vagina; Swab   Result Value Ref Range    Trichomonas Absent Absent    Yeast Absent Absent    Clue Cells Present (A) Absent    WBCs/high power field 2+ (A) None       Subjective     Radha Bojorquez is an 33 year old female who presents to clinic today for lower abdominal pain intermittently for a couple of weeks., lower back pain for a couple of weeks.  [place] her to [place] luggage and the pain has continued to worsen.  Patient is also requesting gonorrhea and Chlamydia testing.  She also has an umbilical hernia that has been bothering her recently.      ROS: 10 point ROS neg other than the symptoms noted above in the HPI.       Objective    /64   Pulse 60   Temp 98.5  F (36.9  C) (Tympanic)   Resp 21   Wt 67.4 kg (148 lb 8 oz)   SpO2 98%   Nurses notes and VS have been reviewed.    Physical Exam       GENERAL APPEARANCE: alert and moderate distress     EYES: PERRL, EOMI, sclera non-icteric     HENT: oral exam benign, mucus  membranes intact, without ulcers or lesions     NECK: no adenopathy or asymmetry, thyroid normal to palpation     RESP: lungs clear to auscultation - no rales, rhonchi or wheezes     CV: regular rates and rhythm, no murmurs, rubs, or gallop     ABDOMEN: Soft, nontender, umbilical hernia is present but easily reducible     MS: extremities normal- no gross deformities noted; normal muscle tone.  Bilateral low back muscular pain, no tenderness over the spinal processes, positive straight leg raising pain     SKIN: no suspicious lesions or rashes     NEURO: Normal strength and tone, mentation intact and speech normal     PSYCH: normal thought process; no significant mood disturbance      KENTRELL Souza, CNP  [facility]    The use of Dragon/"[employer] dictation services may have been used to construct the content in this note; any grammatical or spelling errors are non-intentional. Please contact the author of this note directly if you are in need of any clarification.

## 2023-08-17 NOTE — PROGRESS NOTES
Clinic Administered Medication Documentation        Patient was given Toradol. Prior to medication administration, verified patient's identity using patient s name and date of birth. Please see MAR and medication order for additional information. Patient instructed to remain in clinic for 15 minutes and report any adverse reaction to staff immediately.    Vial/Syringe: Single dose vial. Was entire vial of medication used? Yes    HAL Serrato, Medical Assistant

## 2023-08-17 NOTE — LETTER
August 17, 2023      Radha Bojorquez  [address]  [place] [address]        To Whom It May Concern:    Radha Bojorquez  was seen on 08/17/2023.  Please excuse her  until 08/24/2023 due to injury to back.She may return on 8/24/2023 with the following restrictions, No lifting more than 10 pounds.5 times an hour, no repetitive twisting of the back, no bending forward  more than 30 degrees. She will be reassessed by 09/01/2023 to reassess for restrictions.        Sincerely,        ALEJANDRO BURNHAM, CNP

## 2023-08-17 NOTE — PATIENT INSTRUCTIONS
You have a bacterial infection called bacterial vaginosis/vagintis. This is not a sexually transmitted disease and your partner does not need to be treated.    Please take the medications as prescribed and do not have sexual intercourse until all the medication is gone.  Do not drink alcohol while taking this medications or you will become very sick.     This medication may interfere with birth control medications. While taking the antibiotic I would recommend using a second method of birth control.    Follow up if symptoms fail to improve or worsen.    No Ibuprofen until midnight, then after 800mg Ibuprofen every 8 hours with food.    May also take 1000mg Acetaminophen every 6 hours as needed for pain    Try heat and ice for comfort    Keep appointments with physical therapy..

## 2023-08-18 LAB
C TRACH DNA SPEC QL NAA+PROBE: NEGATIVE
N GONORRHOEA DNA SPEC QL NAA+PROBE: NEGATIVE

## 2024-03-09 ENCOUNTER — HEALTH MAINTENANCE LETTER (OUTPATIENT)
Age: 34
End: 2024-03-09

## 2024-09-19 ENCOUNTER — HOSPITAL ENCOUNTER (EMERGENCY)
Facility: CLINIC | Age: 34
Discharge: HOME | End: 2024-09-19
Attending: EMERGENCY MEDICINE | Admitting: EMERGENCY MEDICINE
Payer: COMMERCIAL

## 2024-09-19 VITALS
HEART RATE: 61 BPM | WEIGHT: 140 LBS | BODY MASS INDEX: 20.73 KG/M2 | HEIGHT: 69 IN | RESPIRATION RATE: 18 BRPM | SYSTOLIC BLOOD PRESSURE: 117 MMHG | TEMPERATURE: 98.3 F | OXYGEN SATURATION: 100 % | DIASTOLIC BLOOD PRESSURE: 81 MMHG

## 2024-09-19 DIAGNOSIS — K42.9 UMBILICAL HERNIA WITHOUT OBSTRUCTION AND WITHOUT GANGRENE: ICD-10-CM

## 2024-09-19 PROCEDURE — 99282 EMERGENCY DEPT VISIT SF MDM: CPT

## 2024-09-19 ASSESSMENT — COLUMBIA-SUICIDE SEVERITY RATING SCALE - C-SSRS
6. HAVE YOU EVER DONE ANYTHING, STARTED TO DO ANYTHING, OR PREPARED TO DO ANYTHING TO END YOUR LIFE?: NO
1. IN THE PAST MONTH, HAVE YOU WISHED YOU WERE DEAD OR WISHED YOU COULD GO TO SLEEP AND NOT WAKE UP?: NO
2. HAVE YOU ACTUALLY HAD ANY THOUGHTS OF KILLING YOURSELF IN THE PAST MONTH?: NO

## 2024-09-19 ASSESSMENT — ACTIVITIES OF DAILY LIVING (ADL): ADLS_ACUITY_SCORE: 35

## 2024-09-20 ENCOUNTER — OFFICE VISIT (OUTPATIENT)
Dept: FAMILY MEDICINE | Facility: CLINIC | Age: 34
End: 2024-09-20
Payer: COMMERCIAL

## 2024-09-20 VITALS
BODY MASS INDEX: 21.42 KG/M2 | HEART RATE: 87 BPM | OXYGEN SATURATION: 99 % | SYSTOLIC BLOOD PRESSURE: 116 MMHG | WEIGHT: 144.6 LBS | RESPIRATION RATE: 13 BRPM | HEIGHT: 69 IN | TEMPERATURE: 97.2 F | DIASTOLIC BLOOD PRESSURE: 80 MMHG

## 2024-09-20 DIAGNOSIS — K42.9 UMBILICAL HERNIA WITHOUT OBSTRUCTION AND WITHOUT GANGRENE: Primary | ICD-10-CM

## 2024-09-20 DIAGNOSIS — Z30.09 BIRTH CONTROL COUNSELING: ICD-10-CM

## 2024-09-20 DIAGNOSIS — F33.1 MODERATE EPISODE OF RECURRENT MAJOR DEPRESSIVE DISORDER (H): ICD-10-CM

## 2024-09-20 PROCEDURE — 99213 OFFICE O/P EST LOW 20 MIN: CPT

## 2024-09-20 ASSESSMENT — PATIENT HEALTH QUESTIONNAIRE - PHQ9
SUM OF ALL RESPONSES TO PHQ QUESTIONS 1-9: 15
10. IF YOU CHECKED OFF ANY PROBLEMS, HOW DIFFICULT HAVE THESE PROBLEMS MADE IT FOR YOU TO DO YOUR WORK, TAKE CARE OF THINGS AT HOME, OR GET ALONG WITH OTHER PEOPLE: VERY DIFFICULT
SUM OF ALL RESPONSES TO PHQ QUESTIONS 1-9: 15

## 2024-09-20 NOTE — ED TRIAGE NOTES
Pt has hx of 2 umbilical hernia that is usually able to be reduced but unable to do today.      Triage Assessment (Adult)       Row Name 09/19/24 2023          Triage Assessment    Airway WDL WDL        Respiratory WDL    Respiratory WDL WDL        Skin Circulation/Temperature WDL    Skin Circulation/Temperature WDL WDL        Cardiac WDL    Cardiac WDL WDL        Peripheral/Neurovascular WDL    Peripheral Neurovascular WDL WDL        Cognitive/Neuro/Behavioral WDL    Cognitive/Neuro/Behavioral WDL WDL

## 2024-09-20 NOTE — ED PROVIDER NOTES
"  Emergency Department Note      History of Present Illness     Chief Complaint   Hernia (Pt has 2 umbilical hernia that intermittently needs to be reduced. Pt unable to reduce hernia by herself. Denies N/V.)      HPI   Radha Bojorquez is a 34 year old female who presents with a umbilical hernia. The patient has intermittent problems with the hernia but can typically reduce it by herself. She reports she might have eaten to quickly at 1630 as she quickly gelt the hernia after. She started to have the hernia in 2016 and has just been dealing with it since.    Independent Historian   None    Review of External Notes   Yes-I reviewed the patient's visit with her primary doctor in April 2019 regarding her umbilical hernia.    Past Medical History     Medical History and Problem List   Left traumatic pneumothorax  PROM  Thrombocytopenia  Trichomonal vaginitis during pregnancy, antepartum  Anemia  Depression  Hemoglobin C trait  History of thrombocytopenia  Umbilical hernia without obstruction and without gangrene    Medications   Vit D  Flexeril  Ferosul  Alesse  Roxicodone  Medrol Dosepak      Physical Exam     Patient Vitals for the past 24 hrs:   BP Temp Temp src Pulse Resp SpO2 Height Weight   09/19/24 2145 117/81 -- -- 61 -- 100 % -- --   09/19/24 2021 129/85 98.3  F (36.8  C) Oral 74 18 100 % 1.753 m (5' 9\") 63.5 kg (140 lb)     Physical Exam  Eye:  Pupils are equal, round, and reactive.  Extraocular movements intact.    ENT:  No rhinorrhea.  Moist mucus membranes.  Normal tongue and tonsil.    Abdomen:  there is a protruding defect at the umbilicus consistent with incarcerated hernia. Mild tenderness with no rebound or guarding.    Musculoskeletal:  Normal movement of all extremities without evidence for deficit.    Skin:  Warm and dry without rashes.    Neurologic:  Non-focal exam without asymmetric weakness or numbness.     Psychiatric:  Normal affect with appropriate interaction with examiner.      Diagnostics "     Lab Results   Labs Ordered and Resulted from Time of ED Arrival to Time of ED Departure - No data to display    Imaging   No orders to display       Independent Interpretation   None    ED Course      Medications Administered   Medications - No data to display    Procedures   Procedures     Discussion of Management   None    ED Course   ED Course as of 09/20/24 0224   Thu Sep 19, 2024   2128 I obtained history and examined the patient as noted above         Additional Documentation  None    Medical Decision Making / Diagnosis     CMS Diagnoses: None    MIPS       None    MDM   Radha Bojorquez is a 34 year old female with a known umbilical hernia presenting to us because of concerns for incarceration.  She notes that it had popped out and she was not able to push it back in secondary to pain.  This occurred within the past 2 hours.  While being in the ER room, she has been applying consistent pressure with a cool compress and when I assessed her, the hernia was already significantly reduced.  I was able to manually manipulated to result in full reduction.  I do not believe she requires imaging as there is no sign of ongoing strangulation and no worries for bowel obstruction.  I have put in for formal surgical follow-up to have this repaired.  She will otherwise return to the ER for any worsening of condition or other emergent concerns.    Disposition   The patient was discharged.     Diagnosis     ICD-10-CM    1. Umbilical hernia without obstruction and without gangrene  K42.9 Adult Gen Surg  Referral           Discharge Medications   Discharge Medication List as of 9/19/2024  9:43 PM            Scribe Disclosure:  I, Mannie Hackett, am serving as a scribe at 9:27 PM on 9/19/2024 to document services personally performed by Trierweiler, Chad A, MD based on my observations and the provider's statements to me.        Trierweiler, Chad A, MD  09/20/24 0225

## 2024-09-20 NOTE — PROGRESS NOTES
Assessment & Plan     Umbilical hernia without obstruction and without gangrene  Stable.  Has an appointment with surgery in October to discuss repair.  Encouraged her to eat smaller meals at regular intervals and eat plenty of fluids.  - Ob/Gyn  Referral    Moderate episode of recurrent major depressive disorder (H)  PHQ-9 score: 15.  She reports she does not like to take any medications.  Has been feeling overwhelmed, tired.  Has 6 children at home.  Denies thoughts of self-harm or suicidal ideation.  She is interested in therapy, mental health referral placed for her today.  - Adult Mental Health  Referral    Birth control counseling  Not currently on any birth control.  Was told by previous surgeon that should wait for her to be done having children before another hernia repair.  She does not want any more children at this time but her fiancé would like another child.  She is undecided on whether she would like to start birth control but is most interested in IUD or implant.  Was previously on Depo shot and tolerated that well.  - Ob/Gyn  Referral      MED REC REQUIRED{Post Medication Reconciliation Status:  Patient was not discharged from an inpatient facility or TCU  Nicotine/Tobacco Cessation  She reports that she has been smoking cigarettes. She has never used smokeless tobacco.  Nicotine/Tobacco Cessation Plan  Information offered: Patient not interested at this time      Depression Screening Follow Up        9/20/2024    11:36 AM   PHQ   PHQ-9 Total Score 15   Q9: Thoughts of better off dead/self-harm past 2 weeks Not at all         Follow Up Actions Taken  Crisis resource information provided in After Visit Summary  Mental Health Referral placed       Plan to follow-up for routine annual physical.    Devendra Garcia is a 34 year old, presenting for the following health issues:  Hospital F/U (Follow up after ER visit for umbilical hernia)      9/20/2024     1:00 PM  "  Additional Questions   Roomed by Josesito LEWIS RN     HPI     Following up after being seen in the emergency department on 9/19/2024 for umbilical hernia that she was unable to reduce.    HPI   Radha Bojorquez is a 34 year old female who presents with a umbilical hernia. The patient has intermittent problems with the hernia but can typically reduce it by herself. She reports she might have eaten to quickly at 1630 as she quickly felt the hernia after. She started to have the hernia in 2016 and has just been dealing with it since.    Hernia was successfully reduced in the emergency department.  She reports it is not painful today but issues with her hernia has increased lately.  Reports is usually occurs when she eats and now tries to not eat as often.  Since she is not eating as frequently as starting to get headaches.  Had a previous hernia repair in 2015 but after several pregnancies hernia returned.  Has a total of 6 children and her fiancé would like more children.  She was told by her previous surgeon to wait for hernia repair until she was done having children.  She does have an appointment with surgery scheduled for October to discuss hernia repair as she does not want any more children.  She is feeling tired and overwhelmed.            9/20/2024    11:36 AM   PHQ   PHQ-9 Total Score 15   Q9: Thoughts of better off dead/self-harm past 2 weeks Not at all          Review of Systems  Constitutional, HEENT, cardiovascular, pulmonary, gi and gu systems are negative, except as otherwise noted.      Objective    /80 (BP Location: Left arm, Patient Position: Sitting, Cuff Size: Adult Regular)   Pulse 87   Temp 97.2  F (36.2  C) (Tympanic)   Resp 13   Ht 1.753 m (5' 9\")   Wt 65.6 kg (144 lb 9.6 oz)   SpO2 99%   BMI 21.35 kg/m    Body mass index is 21.35 kg/m .    Physical Exam   GENERAL: alert, no distress, and fatigued  RESP: lungs clear to auscultation - no rales, rhonchi or wheezes  CV: regular rate and " rhythm, normal S1 S2, no S3 or S4, no murmur, click or rub, no peripheral edema  ABDOMEN: soft, nontender, without hepatosplenomegaly or masses and hernia   PSYCH: mentation appears normal, tearful, and fatigued        Signed Electronically by: KENTRELL Baumann CNP

## 2024-09-25 NOTE — PROGRESS NOTES
"SUBJECTIVE:   Radha Bojorquez is a 34 year old who presents to the clinic for discussion of birth control methods.   She has used the following methods in the past: MARCIO and Depo Provera  Today she is interested in discussing Mirena IUD, Paragard IUD, Mare IUD, Nexplanon, and Tubal ligation.  She says that she has 6 children and does not desire more, but her boyfriend does. She has a hernia and is in pain.  She has had it repaired in the past. She was told that they would not repair it again without reliable birth control.  She smokes cigarettes and says that she is willing to get help to stop smoking. She is also due for a pap. She has been sexually active without contraception during the last 2 weeks (last time yesterday).     Denies the following contraindications to estrogen/progesterone combined contraception:  Migraine with aura  Smoking over age 35  Liver disease  Personal history of blood clot or stroke   History of heart disease  History of breast cancer  Undiagnosed vaginal bleeding  Hypertension  Pregnancy    Denies the following contraindications to the IUD:  Distortion of the uterine cavity  Isma's disease/copper allergy  Active pelvic infection  Unexplained uterine bleeding  Known or suspected pregnancy  Breast cancer or liver disease      ROS:   12 point review of systems negative other than symptoms noted below or in the HPI.    EXAM:  /64   Ht 1.753 m (5' 9\")   Wt 64.9 kg (143 lb)   LMP 09/16/2024   BMI 21.12 kg/m    Body mass index is 21.12 kg/m .    ASSESSMENT/PLAN:    ICD-10-CM    1. Birth control counseling  Z30.09 Ob/Gyn  Referral      2. Umbilical hernia without obstruction and without gangrene  K42.9 Ob/Gyn  Referral        There are no contraindications to the use of Mirena IUD    COUNSELING:  Reviewed risks and benefits of contraceptive use  Discussed proper use of chosen method  Handouts/Instrucions provided    We reviewed the full IUD placement procedure and " risks.      Long discussion about the different methods. I encouraged an MD consult if she is interested in permanent sterilization.  She decided that she would like to return in 2 weeks for a pregnancy test, pap and Mirena placement. I urged to to have no unprotected intercourse for the next 2 weeks due to risk of pregnancy when placing the IUD.  She verbalized understanding and agreement with plan.

## 2024-09-27 ENCOUNTER — TRANSFERRED RECORDS (OUTPATIENT)
Dept: HEALTH INFORMATION MANAGEMENT | Facility: CLINIC | Age: 34
End: 2024-09-27

## 2024-10-01 ENCOUNTER — OFFICE VISIT (OUTPATIENT)
Dept: OBGYN | Facility: CLINIC | Age: 34
End: 2024-10-01
Payer: COMMERCIAL

## 2024-10-01 VITALS
HEIGHT: 69 IN | SYSTOLIC BLOOD PRESSURE: 116 MMHG | WEIGHT: 143 LBS | BODY MASS INDEX: 21.18 KG/M2 | DIASTOLIC BLOOD PRESSURE: 64 MMHG

## 2024-10-01 DIAGNOSIS — F17.200 NICOTINE DEPENDENCE, UNCOMPLICATED, UNSPECIFIED NICOTINE PRODUCT TYPE: Primary | ICD-10-CM

## 2024-10-01 DIAGNOSIS — K42.9 UMBILICAL HERNIA WITHOUT OBSTRUCTION AND WITHOUT GANGRENE: ICD-10-CM

## 2024-10-01 DIAGNOSIS — Z30.09 BIRTH CONTROL COUNSELING: ICD-10-CM

## 2024-10-01 PROCEDURE — 99203 OFFICE O/P NEW LOW 30 MIN: CPT | Performed by: ADVANCED PRACTICE MIDWIFE

## 2024-10-01 NOTE — NURSING NOTE
"Chief Complaint   Patient presents with    Contraception     Discuss options, soon to have Hernia surgery and needs to have a contraception plan in place       Initial /64   Ht 1.753 m (5' 9\")   Wt 64.9 kg (143 lb)   LMP 09/16/2024   BMI 21.12 kg/m   Estimated body mass index is 21.12 kg/m  as calculated from the following:    Height as of this encounter: 1.753 m (5' 9\").    Weight as of this encounter: 64.9 kg (143 lb).  BP completed using cuff size: regular    Questioned patient about current smoking habits.  Pt. currently smokes.  Advised about smoking cessation.      No obstetric history on file.    The following HM Due: pap smear    Gilda Bishop CMA on 10/1/2024 at 10:13 AM    "

## 2024-10-03 ENCOUNTER — OFFICE VISIT (OUTPATIENT)
Dept: SURGERY | Facility: CLINIC | Age: 34
End: 2024-10-03
Payer: COMMERCIAL

## 2024-10-03 VITALS
OXYGEN SATURATION: 100 % | HEART RATE: 85 BPM | DIASTOLIC BLOOD PRESSURE: 62 MMHG | SYSTOLIC BLOOD PRESSURE: 120 MMHG | BODY MASS INDEX: 21.18 KG/M2 | WEIGHT: 143 LBS | HEIGHT: 69 IN

## 2024-10-03 DIAGNOSIS — M62.08 DIASTASIS RECTI: Primary | ICD-10-CM

## 2024-10-03 DIAGNOSIS — K42.9 UMBILICAL HERNIA WITHOUT OBSTRUCTION AND WITHOUT GANGRENE: ICD-10-CM

## 2024-10-03 PROCEDURE — 99204 OFFICE O/P NEW MOD 45 MIN: CPT | Performed by: SURGERY

## 2024-10-07 NOTE — PROGRESS NOTES
"[facility]  Surgery Consultation    CONSULTATION REQUESTED BY:  Trierweiler, Chad A, MD      HPI: This patient is a 34-year-old female referred by the above-mentioned provider for consultation regarding umbilical hernia.  She recently presented emergency department due to difficulty reducing her hernia which has been present for many years.  This was ultimately able to be reduced and she was referred for outpatient evaluation.  She feels that the hernias been present for approximately 17 years.  She did have a prior mesh repair for the last 7 or 8 months she has noted discomfort of pain primarily after meals.    PMH:   has no past medical history on file.  PSH:    has no past surgical history on file.  Social History:   reports that she has been smoking cigarettes. She has never used smokeless tobacco. She reports that she does not currently use alcohol. She reports that she does not use drugs.  Family History:  family history is not on file.  Medications/Allergies: Home medications and allergies reviewed.    ROS:  The 10 point Review of Systems is negative other than noted in the HPI.    Physical Exam:  /62   Pulse 85   Ht 1.753 m (5' 9\")   Wt 64.9 kg (143 lb)   LMP 09/16/2024   SpO2 100%   BMI 21.12 kg/m    GENERAL: Generally appears well.  Psych: Alert and Oriented.  Normal affect  Eyes: Sclera clear  Respiratory:  Lungs clear to ausculation bilaterally with good air excursion  Cardiovascular:  Regular Rate and Rhythm with no murmurs gallops or rubs, normal peripheral pulses  GI: Abdomen Non Distended Soft Mild tenderness to palpation periumbilical Umbilical hernia palpated..  There is also modest diastases recti across the abdomen.  Lymphatic/Hematologic/Immune:  No femoral or cervical lymphadenopathy.  Integumentary:  No rashes  Neurological: grossly intact     All new lab and imaging data was reviewed.     Impression and Plan:  Patient is a 34 year old female with umbilical " hernia in the setting of underlying diastases recti.    PLAN: I discussed with her her management options.  We discussed possibility of minimally invasive hernia repair to simply take care of the hernia but I am concerned that given the amount of diastases that this hernia repair may in fact be compromised by the diastases and that she would be better served by abdominal wall reconstruction.  This was described to her in detail.  The different procedures were also outlined.  She is cullen take this into consideration.  She was encouraged to call back and schedule at her convenience.  I discussed the pathophysiology of hernias and options for repair including laparoscopic VS open.  The risks associated with the procedure including, but not limited to, recurrence, nerve entrapment or injury, persistence of pain, injury to the bowel/bladder, infertility, hematoma, mesh migration, mesh infection, MI, and PE were discussed with the patient. She indicated understanding of the discussion, asked appropriate questions, and provided consent. Signs and symptoms of incarceration were discussed. If these develop in the interim, she promises to call or go straight to the ER. I have provided the patient with an information pamphlet.      Thank you very much for this consult.    Abilio Barone M.D.  [facility]  [phone]    Please route or send letter to:  Primary Care Provider (PCP) and Referring Provider

## 2024-10-10 ENCOUNTER — TELEPHONE (OUTPATIENT)
Dept: SURGERY | Facility: CLINIC | Age: 34
End: 2024-10-10
Payer: COMMERCIAL

## 2024-10-10 NOTE — TELEPHONE ENCOUNTER
Radha saw Dr. Barone 10/3/24 for Diastasis Recti and Umbilical Hernia consult. She has a few general questions for him or nurse before the surgery.     Please call at: [phone]  Ok to leave VM

## 2024-10-10 NOTE — TELEPHONE ENCOUNTER
Patient will be scheduling surgery. Abdominal wall reconstructions vs umbilical hernia repair with abdominal wall reconstruction    Patient had numerous questions    All questions answered via [employer] message and patient encouraged to message or call with any further questions or concerns.    Deja Us RN-BSN

## 2024-10-14 ENCOUNTER — TELEPHONE (OUTPATIENT)
Dept: SURGERY | Facility: CLINIC | Age: 34
End: 2024-10-14
Payer: COMMERCIAL

## 2024-10-14 NOTE — TELEPHONE ENCOUNTER
Name of caller: Patient    Reason for Call:  Tentatively scheduled for Abdominal wall reconstruction on 11/15/24 (Waiting on orders from Dr. Barone) Patient states her employers needs the [facility] paperwork filled out by tomorrow-- would like a call back today     Surgeon:  Abilio Barone MD    Recent Surgery:  No    If yes, when & what type:  N/A      Best phone number to reach pt at is: [phone]   Ok to leave a message with medical info? Yes.

## 2024-10-14 NOTE — TELEPHONE ENCOUNTER
Spoke to patient about her FMLA paper-work. I let her know that Dr Barone will be in the clinic this Thursday and that he will sign her FMLA paper-work. I also let the patient know that she still has plenty of time to get her FMLA paper-work filled and signed by the doctor. The surgery is not scheduled but will be on 11/15/2024. Patient understood the conversations.      Billie

## 2024-10-17 DIAGNOSIS — M62.08 DIASTASIS RECTI: ICD-10-CM

## 2024-10-17 DIAGNOSIS — K42.9 UMBILICAL HERNIA WITHOUT OBSTRUCTION AND WITHOUT GANGRENE: Primary | ICD-10-CM

## 2024-10-23 ENCOUNTER — TELEPHONE (OUTPATIENT)
Dept: SURGERY | Facility: CLINIC | Age: 34
End: 2024-10-23
Payer: COMMERCIAL

## 2024-10-23 NOTE — TELEPHONE ENCOUNTER
Type of surgery: Abdominal wall reconstruction  Location of surgery: [facility]  Date and time of surgery: 11/15/24 at 7:30am  Surgeon: Dr. Abilio Barone  Pre-Op Appt Date: patient to schedule  Post-Op Appt Date: patient to schedule   Packet sent out: Yes  Pre-cert/Authorization completed:  Not Applicable  Date: 10/23/24

## 2024-11-11 ENCOUNTER — OFFICE VISIT (OUTPATIENT)
Dept: FAMILY MEDICINE | Facility: CLINIC | Age: 34
End: 2024-11-11
Payer: COMMERCIAL

## 2024-11-11 VITALS
SYSTOLIC BLOOD PRESSURE: 108 MMHG | DIASTOLIC BLOOD PRESSURE: 56 MMHG | HEART RATE: 67 BPM | WEIGHT: 145.1 LBS | RESPIRATION RATE: 11 BRPM | HEIGHT: 69 IN | TEMPERATURE: 97.5 F | BODY MASS INDEX: 21.49 KG/M2 | OXYGEN SATURATION: 100 %

## 2024-11-11 DIAGNOSIS — Z01.818 PREOP GENERAL PHYSICAL EXAM: Primary | ICD-10-CM

## 2024-11-11 DIAGNOSIS — M62.08 DIASTASIS RECTI: ICD-10-CM

## 2024-11-11 DIAGNOSIS — K42.9 UMBILICAL HERNIA WITHOUT OBSTRUCTION AND WITHOUT GANGRENE: ICD-10-CM

## 2024-11-11 DIAGNOSIS — D64.9 ANEMIA, UNSPECIFIED TYPE: ICD-10-CM

## 2024-11-11 PROBLEM — R51.9 CHRONIC HEADACHES: Status: ACTIVE | Noted: 2024-11-11

## 2024-11-11 PROBLEM — S27.0XXA PNEUMOTHORAX, CLOSED, TRAUMATIC, INITIAL ENCOUNTER: Status: ACTIVE | Noted: 2018-03-01

## 2024-11-11 PROBLEM — S22.32XA CLOSED FRACTURE OF ONE RIB OF LEFT SIDE: Status: ACTIVE | Noted: 2018-03-01

## 2024-11-11 PROBLEM — G89.29 CHRONIC HEADACHES: Status: ACTIVE | Noted: 2024-11-11

## 2024-11-11 LAB
ANION GAP SERPL CALCULATED.3IONS-SCNC: 10 MMOL/L (ref 7–15)
BUN SERPL-MCNC: 11.4 MG/DL (ref 6–20)
CALCIUM SERPL-MCNC: 8.9 MG/DL (ref 8.8–10.4)
CHLORIDE SERPL-SCNC: 109 MMOL/L (ref 98–107)
CREAT SERPL-MCNC: 0.8 MG/DL (ref 0.51–0.95)
EGFRCR SERPLBLD CKD-EPI 2021: >90 ML/MIN/1.73M2
ERYTHROCYTE [DISTWIDTH] IN BLOOD BY AUTOMATED COUNT: 14.8 % (ref 10–15)
GLUCOSE SERPL-MCNC: 90 MG/DL (ref 70–99)
HCG UR QL: NEGATIVE
HCO3 SERPL-SCNC: 22 MMOL/L (ref 22–29)
HCT VFR BLD AUTO: 31.4 % (ref 35–47)
HGB BLD-MCNC: 10.6 G/DL (ref 11.7–15.7)
MCH RBC QN AUTO: 27.4 PG (ref 26.5–33)
MCHC RBC AUTO-ENTMCNC: 33.8 G/DL (ref 31.5–36.5)
MCV RBC AUTO: 81 FL (ref 78–100)
PLATELET # BLD AUTO: 203 10E3/UL (ref 150–450)
POTASSIUM SERPL-SCNC: 4.1 MMOL/L (ref 3.4–5.3)
RBC # BLD AUTO: 3.87 10E6/UL (ref 3.8–5.2)
SODIUM SERPL-SCNC: 141 MMOL/L (ref 135–145)
WBC # BLD AUTO: 4.6 10E3/UL (ref 4–11)

## 2024-11-11 PROCEDURE — 99214 OFFICE O/P EST MOD 30 MIN: CPT

## 2024-11-11 PROCEDURE — 85027 COMPLETE CBC AUTOMATED: CPT

## 2024-11-11 PROCEDURE — 83540 ASSAY OF IRON: CPT

## 2024-11-11 PROCEDURE — 80048 BASIC METABOLIC PNL TOTAL CA: CPT

## 2024-11-11 PROCEDURE — 36415 COLL VENOUS BLD VENIPUNCTURE: CPT

## 2024-11-11 PROCEDURE — 82728 ASSAY OF FERRITIN: CPT

## 2024-11-11 PROCEDURE — 81025 URINE PREGNANCY TEST: CPT

## 2024-11-11 PROCEDURE — 83550 IRON BINDING TEST: CPT

## 2024-11-11 ASSESSMENT — PATIENT HEALTH QUESTIONNAIRE - PHQ9
SUM OF ALL RESPONSES TO PHQ QUESTIONS 1-9: 9
SUM OF ALL RESPONSES TO PHQ QUESTIONS 1-9: 9
10. IF YOU CHECKED OFF ANY PROBLEMS, HOW DIFFICULT HAVE THESE PROBLEMS MADE IT FOR YOU TO DO YOUR WORK, TAKE CARE OF THINGS AT HOME, OR GET ALONG WITH OTHER PEOPLE: EXTREMELY DIFFICULT

## 2024-11-11 NOTE — PROGRESS NOTES
Preoperative Evaluation  [facility]  [address] [place] [address]  Phone: [phone]  Fax: [phone]  Primary Provider: Physician No Ref-Primary  Pre-op Performing Provider: KENTRELL Baumann CNP  Nov 11, 2024 11/11/2024   Surgical Information   What procedure is being done? Abdominal reconstructive surgery    Facility or Hospital where procedure/surgery will be performed: [facility]    Who is doing the procedure / surgery? Dr moody    Date of surgery / procedure: 11/15/2024    Time of surgery / procedure: 5:30am    Where do you plan to recover after surgery? at home with family        Patient-reported     Fax number for surgical facility: Note does not need to be faxed, will be available electronically in Epic.    Assessment & Plan     The proposed surgical procedure is considered INTERMEDIATE risk.    Preop general physical exam  Preoperative exam completed today. Has tolerated previous surgeries/anesthesia well. Cardiac risk is very low. She does not take any medications. Chronic conditions are stable. Reviewed medication hold times and preoperative education.   - Basic metabolic panel  (Ca, Cl, CO2, Creat, Gluc, K, Na, BUN)  - CBC with platelets  - HCG qualitative urine    Umbilical hernia without obstruction and without gangrene  Diastasis recti  Planning for surgical repair on 11/15.        - No identified additional risk factors other than previously addressed    Preoperative Medication Instructions  Antiplatelet or Anticoagulation Medication Instructions   - Patient is on no antiplatelet or anticoagulation medications.    Additional Medication Instructions   - Herbal medications and vitamins: DO NOT TAKE 14 days prior to surgery.    Recommendation  Approval given to proceed with proposed procedure pending review of diagnostic evaluation.    Devendra Garcia is a 34 year old, presenting for the following:  Pre-Op Exam           11/11/2024     9:01 AM   Additional Questions   Roomed by Josesito LEWIS RN     HPI related to upcoming procedure:     Tolerated previous surgery without difficutly.  No personal/family history of problems with anesthesia or bleeding.        11/11/2024   Pre-Op Questionnaire   Have you ever had a heart attack or stroke? No    Have you ever had surgery on your heart or blood vessels, such as a stent placement, a coronary artery bypass, or surgery on an artery in your head, neck, heart, or legs? (!) UNKNOWN - upon further questioning no history    Do you have chest pain with activity? (!) YES - no new    Do you have a history of heart failure? No    Do you currently have a cold, bronchitis or symptoms of other infection? No    Do you have a cough, shortness of breath, or wheezing? No    Do you or anyone in your family have previous history of blood clots? No    Do you or does anyone in your family have a serious bleeding problem such as prolonged bleeding following surgeries or cuts? No    Have you ever had problems with anemia or been told to take iron pills? (!) YES - during pregnancy    Have you had any abnormal blood loss such as black, tarry or bloody stools, or abnormal vaginal bleeding? No    Have you ever had a blood transfusion? No    Are you willing to have a blood transfusion if it is medically needed before, during, or after your surgery? Yes    Have you or any of your relatives ever had problems with anesthesia? (!) UNKNOWN no personal histry    Do you have sleep apnea, excessive snoring or daytime drowsiness? No    Do you have any artifical heart valves or other implanted medical devices like a pacemaker, defibrillator, or continuous glucose monitor? No    Do you have artificial joints? No    Are you allergic to latex? No        Patient-reported     Health Care Directive  Patient does not have a Health Care Directive: Discussed advance care planning with patient; however, patient declined at this  time.    Preoperative Review of    reviewed - no record of controlled substances prescribed.      Status of Chronic Conditions:  See problem list for active medical problems.  Problems all longstanding and stable, except as noted/documented.  See ROS for pertinent symptoms related to these conditions.    Patient Active Problem List    Diagnosis Date Noted    History of precipitous labor and deliveries, antepartum 08/21/2018     Priority: Medium     Formatting of this note might be different from the original. Formatting of this note might be different from the original. 4th baby born inadvertently at home- fast labor Formatting of this note might be different from the original. 4th baby born inadvertently at home- fast labor      Tobacco smoking affecting pregnancy, antepartum 08/21/2018     Priority: Medium     Formatting of this note might be different from the original. At NOB cut down from 6 to 3 cig daily.  Encouraged cessation.      Depression 03/01/2018     Priority: Medium     Formatting of this note might be different from the original. Hx depression and postpartum depression. Off meds during pregnancy.  Does not have a current therapist      Anemia 10/07/2015     Priority: Medium     Formatting of this note might be different from the original. hemoglobin at 30 weeks= 9.4, low ferritin.  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 Plat NOB= 172, 30 weeks= 164      Umbilical hernia without obstruction and without gangrene 05/05/2015     Priority: Medium     Formatting of this note might be different from the original. Had repaired in 4th pregnancy- feels like surgery didn't hold.        No past medical history on file.  No past surgical history on file.  No current outpatient medications on file.       No Known Allergies  "    Social History     Tobacco Use    Smoking status: Every Day     Types: Cigarettes    Smokeless tobacco: Never   Substance Use Topics    Alcohol use: Not Currently     History reviewed. No pertinent family history.  History   Drug Use Unknown             Review of Systems  CONSTITUTIONAL: NEGATIVE for fever, chills, change in weight  ENT/MOUTH: NEGATIVE for ear, mouth and throat problems  RESP: NEGATIVE for significant cough or SOB  CV: NEGATIVE for chest pain, palpitations or peripheral edema  MUSCULOSKELETAL: low back pain  NEURO: NEGATIVE for weakness, dizziness or paresthesias  PSYCHIATRIC: NEGATIVE for changes in mood or affect    Objective    /56 (BP Location: Left arm, Patient Position: Sitting, Cuff Size: Adult Regular)   Pulse 67   Temp 97.5  F (36.4  C) (Tympanic)   Resp 11   Ht 1.753 m (5' 9\")   Wt 65.8 kg (145 lb 1.6 oz)   LMP 09/16/2024   SpO2 100%   BMI 21.43 kg/m     Estimated body mass index is 21.43 kg/m  as calculated from the following:    Height as of this encounter: 1.753 m (5' 9\").    Weight as of this encounter: 65.8 kg (145 lb 1.6 oz).    Physical Exam  GENERAL: alert and no distress  EYES: Eyes grossly normal to inspection, PERRL and conjunctivae and sclerae normal  HENT: ear canals and TM's normal, nose and mouth without ulcers or lesions  NECK: no adenopathy, no asymmetry, masses, or scars  RESP: lungs clear to auscultation - no rales, rhonchi or wheezes  CV: regular rate and rhythm, normal S1 S2, no S3 or S4, no murmur, click or rub, no peripheral edema  ABDOMEN: soft, nontender  MS: no gross musculoskeletal defects noted, no edema  SKIN: no suspicious lesions or rashes  NEURO: Normal strength and tone, mentation intact and speech normal  PSYCH: mentation appears normal, affect normal/bright    No results for input(s): \"HGB\", \"PLT\", \"INR\", \"NA\", \"POTASSIUM\", \"CR\", \"A1C\" in the last 8760 hours.     Diagnostics  Labs pending at this time.  Results will be reviewed when " available.   No EKG required, no history of coronary heart disease, significant arrhythmia, peripheral arterial disease or other structural heart disease.    Revised Cardiac Risk Index (RCRI)  The patient has the following serious cardiovascular risks for perioperative complications:   - No serious cardiac risks = 0 points     RCRI Interpretation: 0 points: Class I (very low risk - 0.4% complication rate)         Signed Electronically by: KENTRELL Baumann CNP  A copy of this evaluation report is provided to the requesting physician.

## 2024-11-11 NOTE — PATIENT INSTRUCTIONS
How to Take Your Medication Before Surgery  Preoperative Medication Instructions   Antiplatelet or Anticoagulation Medication Instructions   - Patient is on no antiplatelet or anticoagulation medications.    Additional Medication Instructions   - Herbal medications and vitamins: DO NOT TAKE 14 days prior to surgery.       Patient Education   Preparing for Your Surgery  For Adults  Getting started  In most cases, a nurse will call to review your health history and instructions. They will give you an arrival time based on your scheduled surgery time. Please be ready to share:  Your doctor's clinic name and phone number  Your medical, surgical, and anesthesia history  A list of allergies and sensitivities  A list of medicines, including herbal treatments and over-the-counter drugs  Whether the patient has a legal guardian (ask how to send us the papers in advance)  Note: You may not receive a call if you were seen at our PAC (Preoperative Assessment Center).  Please tell us if you're pregnant--or if there's any chance you might be pregnant. Some surgeries may injure a fetus (unborn baby), so they require a pregnancy test. Surgeries that are safe for a fetus don't always need a test, and you can choose whether to have one.   Preparing for surgery  Within 10 to 30 days of surgery: Have a pre-op exam (sometimes called an H&P, or History and Physical). This can be done at a clinic or pre-operative center.  If you're having a , you may not need this exam. Talk to your care team.  At your pre-op exam, talk to your care team about all medicines you take. (This includes CBD oil and any drugs, such as THC, marijuana, and other forms of cannabis.) If you need to stop any medicine before surgery, ask when to start taking it again.  This is for your safety. Many medicines and drugs can make you bleed too much during surgery. Some change how well surgery (anesthesia) drugs work.  Call your insurance company to let them  know you're having surgery. (If you don't have insurance, call [phone].)  Call your clinic if there's any change in your health. This includes a scrape or scratch near the surgery site, or any signs of a cold (sore throat, runny nose, cough, rash, fever).  Eating and drinking guidelines  For your safety: Unless your surgeon tells you otherwise, follow the guidelines below.  Eat and drink as normal until 8 hours before you arrive for surgery. After that, no food or milk. You can spit out gum when you arrive.  Drink clear liquids until 2 hours before you arrive. These are liquids you can see through, like water, Gatorade, and Propel Water. They also include plain black coffee and tea (no cream or milk).  No alcohol for 24 hours before you arrive. The night before surgery, stop any drinks that contain THC.  If your care team tells you to take medicine on the morning of surgery, it's okay to take it with a sip of water. No other medicines or drugs are allowed (including CBD oil)--follow your care team's instructions.  If you have questions the day of surgery, call your hospital or surgery center.   Preventing infection  Shower or bathe the night before and the morning of surgery. Follow the instructions your clinic gave you. (If no instructions, use regular soap.)  Don't shave or clip hair near your surgery site. We'll remove the hair if needed.  Don't smoke or vape the morning of surgery. No chewing tobacco for 6 hours before you arrive. A nicotine patch is okay. You may spit out nicotine gum when you arrive.  For some surgeries, the surgeon will tell you to fully quit smoking and nicotine.  We will make every effort to keep you safe from infection. We will:  Clean our hands often with soap and water (or an alcohol-based hand rub).  Clean the skin at your surgery site with a special soap that kills germs.  Give you a special gown to keep you warm. (Cold raises the risk of infection.)  Wear hair covers, masks,  gowns, and gloves during surgery.  Give antibiotic medicine, if prescribed. Not all surgeries need this medicine.  What to bring on the day of surgery  Photo ID and insurance card  Copy of your health care directive, if you have one  Glasses and hearing aids (bring cases)  You can't wear contacts during surgery  Inhaler and eye drops, if you use them (tell us about these when you arrive)  CPAP machine or breathing device, if you use them  A few personal items, if spending the night  If you have . . .  A pacemaker, ICD (cardiac defibrillator), or other implant: Bring the ID card.  An implanted stimulator: Bring the remote control.  A legal guardian: Bring a copy of the certified (court-stamped) guardianship papers.  Please remove any jewelry, including body piercings. Leave jewelry and other valuables at home.  If you're going home the day of surgery  You must have a responsible adult drive you home. They should stay with you overnight as well.  If you don't have someone to stay with you, and you aren't safe to go home alone, we may keep you overnight. Insurance often won't pay for this.  After surgery  If it's hard to control your pain or you need more pain medicine, please call your surgeon's office.  Questions?   If you have any questions for your care team, list them here:   ____________________________________________________________________________________________________________________________________________________________________________________________________________________________________________________________  For informational purposes only. Not to replace the advice of your health care provider. Copyright   2003, 2019 [employer] [facility]. All rights reserved. Clinically reviewed by Ryne Pritchett MD. [employer] [id number] - REV 08/24.

## 2024-11-12 DIAGNOSIS — D64.9 ANEMIA, UNSPECIFIED TYPE: Primary | ICD-10-CM

## 2024-11-12 LAB
FERRITIN SERPL-MCNC: 28 NG/ML (ref 6–175)
IRON BINDING CAPACITY (ROCHE): 322 UG/DL (ref 240–430)
IRON SATN MFR SERPL: 10 % (ref 15–46)
IRON SERPL-MCNC: 33 UG/DL (ref 37–145)

## 2024-11-12 RX ORDER — FERROUS SULFATE 325(65) MG
325 TABLET ORAL
Qty: 90 TABLET | Refills: 3 | Status: SHIPPED | OUTPATIENT
Start: 2024-11-12

## 2024-11-12 NOTE — PROGRESS NOTES
PTA medications updated by Medication Scribe prior to surgery via phone call with patient (last doses completed by Nurse)     Medication history sources: Patient and H&P  In the past week, patient estimated taking medication this percent of the time: Greater than 90%      Significant changes made to the medication list:  None      Additional medication history information:   None    Medication reconciliation completed by provider prior to medication history? No    Time spent in this activity: 10 minutes    The information provided in this note is only as accurate as the sources available at the time of update(s)      Prior to Admission medications    Not on File       Medication history completed by: Rustam Chapman

## 2024-11-14 ENCOUNTER — ANESTHESIA EVENT (OUTPATIENT)
Dept: SURGERY | Facility: CLINIC | Age: 34
End: 2024-11-14
Payer: COMMERCIAL

## 2024-11-15 ENCOUNTER — HOSPITAL ENCOUNTER (OUTPATIENT)
Facility: CLINIC | Age: 34
Discharge: HOME | End: 2024-11-16
Attending: SURGERY | Admitting: SURGERY
Payer: COMMERCIAL

## 2024-11-15 ENCOUNTER — ANESTHESIA (OUTPATIENT)
Dept: SURGERY | Facility: CLINIC | Age: 34
End: 2024-11-15
Payer: COMMERCIAL

## 2024-11-15 DIAGNOSIS — M62.08 DIASTASIS RECTI: Primary | ICD-10-CM

## 2024-11-15 DIAGNOSIS — G89.18 ACUTE POST-OPERATIVE PAIN: ICD-10-CM

## 2024-11-15 LAB — HCG UR QL: NEGATIVE

## 2024-11-15 PROCEDURE — 250N000011 HC RX IP 250 OP 636: Performed by: NURSE ANESTHETIST, CERTIFIED REGISTERED

## 2024-11-15 PROCEDURE — 250N000011 HC RX IP 250 OP 636: Performed by: SURGERY

## 2024-11-15 PROCEDURE — 250N000009 HC RX 250: Performed by: SURGERY

## 2024-11-15 PROCEDURE — 250N000025 HC SEVOFLURANE, PER MIN: Performed by: SURGERY

## 2024-11-15 PROCEDURE — 250N000011 HC RX IP 250 OP 636: Performed by: STUDENT IN AN ORGANIZED HEALTH CARE EDUCATION/TRAINING PROGRAM

## 2024-11-15 PROCEDURE — C1781 MESH (IMPLANTABLE): HCPCS | Performed by: SURGERY

## 2024-11-15 PROCEDURE — 360N000076 HC SURGERY LEVEL 3, PER MIN: Performed by: SURGERY

## 2024-11-15 PROCEDURE — 81025 URINE PREGNANCY TEST: CPT | Performed by: ANESTHESIOLOGY

## 2024-11-15 PROCEDURE — 258N000003 HC RX IP 258 OP 636: Performed by: NURSE ANESTHETIST, CERTIFIED REGISTERED

## 2024-11-15 PROCEDURE — 250N000011 HC RX IP 250 OP 636: Performed by: ANESTHESIOLOGY

## 2024-11-15 PROCEDURE — 370N000017 HC ANESTHESIA TECHNICAL FEE, PER MIN: Performed by: SURGERY

## 2024-11-15 PROCEDURE — 15734 MUSCLE-SKIN GRAFT TRUNK: CPT | Mod: 59 | Performed by: SURGERY

## 2024-11-15 PROCEDURE — 250N000013 HC RX MED GY IP 250 OP 250 PS 637: Performed by: STUDENT IN AN ORGANIZED HEALTH CARE EDUCATION/TRAINING PROGRAM

## 2024-11-15 PROCEDURE — 272N000001 HC OR GENERAL SUPPLY STERILE: Performed by: SURGERY

## 2024-11-15 PROCEDURE — 710N000009 HC RECOVERY PHASE 1, LEVEL 1, PER MIN: Performed by: SURGERY

## 2024-11-15 PROCEDURE — 250N000009 HC RX 250: Performed by: NURSE ANESTHETIST, CERTIFIED REGISTERED

## 2024-11-15 PROCEDURE — 999N000141 HC STATISTIC PRE-PROCEDURE NURSING ASSESSMENT: Performed by: SURGERY

## 2024-11-15 PROCEDURE — 250N000013 HC RX MED GY IP 250 OP 250 PS 637: Performed by: SURGERY

## 2024-11-15 DEVICE — SELF-GRIPPING POLYPROPYLENE MESH POLYPROPYLENE WITH POLYLACTIC ACID GRIPS
Type: IMPLANTABLE DEVICE | Site: ABDOMEN | Status: FUNCTIONAL
Brand: PROGRIP

## 2024-11-15 RX ORDER — HYDROMORPHONE HCL IN WATER/PF 6 MG/30 ML
0.2 PATIENT CONTROLLED ANALGESIA SYRINGE INTRAVENOUS
Status: DISCONTINUED | OUTPATIENT
Start: 2024-11-15 | End: 2024-11-16 | Stop reason: HOSPADM

## 2024-11-15 RX ORDER — DEXAMETHASONE SODIUM PHOSPHATE 4 MG/ML
4 INJECTION, SOLUTION INTRA-ARTICULAR; INTRALESIONAL; INTRAMUSCULAR; INTRAVENOUS; SOFT TISSUE
Status: DISCONTINUED | OUTPATIENT
Start: 2024-11-15 | End: 2024-11-15 | Stop reason: HOSPADM

## 2024-11-15 RX ORDER — NALOXONE HYDROCHLORIDE 0.4 MG/ML
0.4 INJECTION, SOLUTION INTRAMUSCULAR; INTRAVENOUS; SUBCUTANEOUS
Status: DISCONTINUED | OUTPATIENT
Start: 2024-11-15 | End: 2024-11-16 | Stop reason: HOSPADM

## 2024-11-15 RX ORDER — ONDANSETRON 2 MG/ML
4 INJECTION INTRAMUSCULAR; INTRAVENOUS EVERY 30 MIN PRN
Status: DISCONTINUED | OUTPATIENT
Start: 2024-11-15 | End: 2024-11-15 | Stop reason: HOSPADM

## 2024-11-15 RX ORDER — ACETAMINOPHEN 325 MG/1
650 TABLET ORAL EVERY 4 HOURS
Status: DISCONTINUED | OUTPATIENT
Start: 2024-11-15 | End: 2024-11-16 | Stop reason: HOSPADM

## 2024-11-15 RX ORDER — PROCHLORPERAZINE MALEATE 10 MG
10 TABLET ORAL EVERY 8 HOURS PRN
Status: DISCONTINUED | OUTPATIENT
Start: 2024-11-15 | End: 2024-11-16 | Stop reason: HOSPADM

## 2024-11-15 RX ORDER — BUPIVACAINE HYDROCHLORIDE 5 MG/ML
INJECTION, SOLUTION EPIDURAL; INTRACAUDAL
Status: DISCONTINUED
Start: 2024-11-15 | End: 2024-11-15 | Stop reason: HOSPADM

## 2024-11-15 RX ORDER — ONDANSETRON 4 MG/1
4 TABLET, ORALLY DISINTEGRATING ORAL EVERY 6 HOURS PRN
Status: DISCONTINUED | OUTPATIENT
Start: 2024-11-15 | End: 2024-11-16 | Stop reason: HOSPADM

## 2024-11-15 RX ORDER — DEXAMETHASONE SODIUM PHOSPHATE 4 MG/ML
INJECTION, SOLUTION INTRA-ARTICULAR; INTRALESIONAL; INTRAMUSCULAR; INTRAVENOUS; SOFT TISSUE PRN
Status: DISCONTINUED | OUTPATIENT
Start: 2024-11-15 | End: 2024-11-15

## 2024-11-15 RX ORDER — SODIUM CHLORIDE, SODIUM LACTATE, POTASSIUM CHLORIDE, CALCIUM CHLORIDE 600; 310; 30; 20 MG/100ML; MG/100ML; MG/100ML; MG/100ML
INJECTION, SOLUTION INTRAVENOUS CONTINUOUS
Status: DISCONTINUED | OUTPATIENT
Start: 2024-11-15 | End: 2024-11-15 | Stop reason: HOSPADM

## 2024-11-15 RX ORDER — OXYCODONE HYDROCHLORIDE 5 MG/1
10 TABLET ORAL EVERY 4 HOURS PRN
Status: DISCONTINUED | OUTPATIENT
Start: 2024-11-15 | End: 2024-11-16 | Stop reason: HOSPADM

## 2024-11-15 RX ORDER — LIDOCAINE 40 MG/G
CREAM TOPICAL
Status: DISCONTINUED | OUTPATIENT
Start: 2024-11-15 | End: 2024-11-16 | Stop reason: HOSPADM

## 2024-11-15 RX ORDER — PROPOFOL 10 MG/ML
INJECTION, EMULSION INTRAVENOUS CONTINUOUS PRN
Status: DISCONTINUED | OUTPATIENT
Start: 2024-11-15 | End: 2024-11-15

## 2024-11-15 RX ORDER — METHOCARBAMOL 500 MG/1
500 TABLET, FILM COATED ORAL EVERY 6 HOURS PRN
Status: DISCONTINUED | OUTPATIENT
Start: 2024-11-15 | End: 2024-11-16 | Stop reason: HOSPADM

## 2024-11-15 RX ORDER — PROPOFOL 10 MG/ML
INJECTION, EMULSION INTRAVENOUS PRN
Status: DISCONTINUED | OUTPATIENT
Start: 2024-11-15 | End: 2024-11-15

## 2024-11-15 RX ORDER — PROCHLORPERAZINE 25 MG
25 SUPPOSITORY, RECTAL RECTAL EVERY 12 HOURS PRN
Status: DISCONTINUED | OUTPATIENT
Start: 2024-11-15 | End: 2024-11-16 | Stop reason: HOSPADM

## 2024-11-15 RX ORDER — FENTANYL CITRATE 50 UG/ML
25 INJECTION, SOLUTION INTRAMUSCULAR; INTRAVENOUS EVERY 5 MIN PRN
Status: DISCONTINUED | OUTPATIENT
Start: 2024-11-15 | End: 2024-11-15 | Stop reason: HOSPADM

## 2024-11-15 RX ORDER — FENTANYL CITRATE 50 UG/ML
50 INJECTION, SOLUTION INTRAMUSCULAR; INTRAVENOUS EVERY 5 MIN PRN
Status: DISCONTINUED | OUTPATIENT
Start: 2024-11-15 | End: 2024-11-15 | Stop reason: HOSPADM

## 2024-11-15 RX ORDER — MAGNESIUM HYDROXIDE 1200 MG/15ML
LIQUID ORAL PRN
Status: DISCONTINUED | OUTPATIENT
Start: 2024-11-15 | End: 2024-11-15 | Stop reason: HOSPADM

## 2024-11-15 RX ORDER — HYDROMORPHONE HCL IN WATER/PF 6 MG/30 ML
0.4 PATIENT CONTROLLED ANALGESIA SYRINGE INTRAVENOUS EVERY 5 MIN PRN
Status: DISCONTINUED | OUTPATIENT
Start: 2024-11-15 | End: 2024-11-15 | Stop reason: HOSPADM

## 2024-11-15 RX ORDER — CEFAZOLIN SODIUM/WATER 2 G/20 ML
2 SYRINGE (ML) INTRAVENOUS SEE ADMIN INSTRUCTIONS
Status: DISCONTINUED | OUTPATIENT
Start: 2024-11-15 | End: 2024-11-15 | Stop reason: HOSPADM

## 2024-11-15 RX ORDER — KETOROLAC TROMETHAMINE 30 MG/ML
INJECTION, SOLUTION INTRAMUSCULAR; INTRAVENOUS PRN
Status: DISCONTINUED | OUTPATIENT
Start: 2024-11-15 | End: 2024-11-15

## 2024-11-15 RX ORDER — NALOXONE HYDROCHLORIDE 0.4 MG/ML
0.2 INJECTION, SOLUTION INTRAMUSCULAR; INTRAVENOUS; SUBCUTANEOUS
Status: DISCONTINUED | OUTPATIENT
Start: 2024-11-15 | End: 2024-11-16 | Stop reason: HOSPADM

## 2024-11-15 RX ORDER — NALOXONE HYDROCHLORIDE 0.4 MG/ML
0.1 INJECTION, SOLUTION INTRAMUSCULAR; INTRAVENOUS; SUBCUTANEOUS
Status: DISCONTINUED | OUTPATIENT
Start: 2024-11-15 | End: 2024-11-15 | Stop reason: HOSPADM

## 2024-11-15 RX ORDER — HYDRALAZINE HYDROCHLORIDE 20 MG/ML
2.5-5 INJECTION INTRAMUSCULAR; INTRAVENOUS EVERY 10 MIN PRN
Status: DISCONTINUED | OUTPATIENT
Start: 2024-11-15 | End: 2024-11-15 | Stop reason: HOSPADM

## 2024-11-15 RX ORDER — LIDOCAINE HYDROCHLORIDE 20 MG/ML
INJECTION, SOLUTION INFILTRATION; PERINEURAL PRN
Status: DISCONTINUED | OUTPATIENT
Start: 2024-11-15 | End: 2024-11-15

## 2024-11-15 RX ORDER — ONDANSETRON 2 MG/ML
4 INJECTION INTRAMUSCULAR; INTRAVENOUS EVERY 6 HOURS PRN
Status: DISCONTINUED | OUTPATIENT
Start: 2024-11-15 | End: 2024-11-16 | Stop reason: HOSPADM

## 2024-11-15 RX ORDER — AMOXICILLIN 250 MG
1 CAPSULE ORAL AT BEDTIME
Status: DISCONTINUED | OUTPATIENT
Start: 2024-11-15 | End: 2024-11-16 | Stop reason: HOSPADM

## 2024-11-15 RX ORDER — HYDROMORPHONE HCL IN WATER/PF 6 MG/30 ML
0.2 PATIENT CONTROLLED ANALGESIA SYRINGE INTRAVENOUS EVERY 5 MIN PRN
Status: DISCONTINUED | OUTPATIENT
Start: 2024-11-15 | End: 2024-11-15 | Stop reason: HOSPADM

## 2024-11-15 RX ORDER — ONDANSETRON 2 MG/ML
INJECTION INTRAMUSCULAR; INTRAVENOUS PRN
Status: DISCONTINUED | OUTPATIENT
Start: 2024-11-15 | End: 2024-11-15

## 2024-11-15 RX ORDER — FENTANYL CITRATE 50 UG/ML
INJECTION, SOLUTION INTRAMUSCULAR; INTRAVENOUS PRN
Status: DISCONTINUED | OUTPATIENT
Start: 2024-11-15 | End: 2024-11-15

## 2024-11-15 RX ORDER — ONDANSETRON 4 MG/1
4 TABLET, ORALLY DISINTEGRATING ORAL EVERY 30 MIN PRN
Status: DISCONTINUED | OUTPATIENT
Start: 2024-11-15 | End: 2024-11-15 | Stop reason: HOSPADM

## 2024-11-15 RX ORDER — SODIUM CHLORIDE, SODIUM LACTATE, POTASSIUM CHLORIDE, CALCIUM CHLORIDE 600; 310; 30; 20 MG/100ML; MG/100ML; MG/100ML; MG/100ML
INJECTION, SOLUTION INTRAVENOUS CONTINUOUS PRN
Status: DISCONTINUED | OUTPATIENT
Start: 2024-11-15 | End: 2024-11-15

## 2024-11-15 RX ORDER — HYDROMORPHONE HCL IN WATER/PF 6 MG/30 ML
0.4 PATIENT CONTROLLED ANALGESIA SYRINGE INTRAVENOUS
Status: DISCONTINUED | OUTPATIENT
Start: 2024-11-15 | End: 2024-11-16 | Stop reason: HOSPADM

## 2024-11-15 RX ORDER — BUPIVACAINE HYDROCHLORIDE 5 MG/ML
INJECTION, SOLUTION PERINEURAL PRN
Status: DISCONTINUED | OUTPATIENT
Start: 2024-11-15 | End: 2024-11-15 | Stop reason: HOSPADM

## 2024-11-15 RX ORDER — OXYCODONE HYDROCHLORIDE 5 MG/1
5 TABLET ORAL EVERY 4 HOURS PRN
Status: DISCONTINUED | OUTPATIENT
Start: 2024-11-15 | End: 2024-11-16 | Stop reason: HOSPADM

## 2024-11-15 RX ORDER — CEFAZOLIN SODIUM/WATER 2 G/20 ML
2 SYRINGE (ML) INTRAVENOUS
Status: COMPLETED | OUTPATIENT
Start: 2024-11-15 | End: 2024-11-15

## 2024-11-15 RX ADMIN — PROPOFOL 150 MCG/KG/MIN: 10 INJECTION, EMULSION INTRAVENOUS at 07:35

## 2024-11-15 RX ADMIN — LIDOCAINE HYDROCHLORIDE 80 MG: 20 INJECTION, SOLUTION INFILTRATION; PERINEURAL at 07:30

## 2024-11-15 RX ADMIN — SODIUM CHLORIDE, POTASSIUM CHLORIDE, SODIUM LACTATE AND CALCIUM CHLORIDE: 600; 310; 30; 20 INJECTION, SOLUTION INTRAVENOUS at 07:27

## 2024-11-15 RX ADMIN — HYDROMORPHONE HYDROCHLORIDE 0.5 MG: 1 INJECTION, SOLUTION INTRAMUSCULAR; INTRAVENOUS; SUBCUTANEOUS at 08:31

## 2024-11-15 RX ADMIN — PROPOFOL 200 MG: 10 INJECTION, EMULSION INTRAVENOUS at 07:31

## 2024-11-15 RX ADMIN — Medication 1 LOZENGE: at 21:19

## 2024-11-15 RX ADMIN — MIDAZOLAM 2 MG: 1 INJECTION INTRAMUSCULAR; INTRAVENOUS at 07:25

## 2024-11-15 RX ADMIN — Medication 2 G: at 07:25

## 2024-11-15 RX ADMIN — DEXAMETHASONE SODIUM PHOSPHATE 4 MG: 4 INJECTION, SOLUTION INTRA-ARTICULAR; INTRALESIONAL; INTRAMUSCULAR; INTRAVENOUS; SOFT TISSUE at 07:35

## 2024-11-15 RX ADMIN — SENNOSIDES AND DOCUSATE SODIUM 1 TABLET: 50; 8.6 TABLET ORAL at 21:37

## 2024-11-15 RX ADMIN — ONDANSETRON 4 MG: 2 INJECTION INTRAMUSCULAR; INTRAVENOUS at 08:56

## 2024-11-15 RX ADMIN — FENTANYL CITRATE 50 MCG: 50 INJECTION, SOLUTION INTRAMUSCULAR; INTRAVENOUS at 10:23

## 2024-11-15 RX ADMIN — FENTANYL CITRATE 50 MCG: 50 INJECTION INTRAMUSCULAR; INTRAVENOUS at 07:47

## 2024-11-15 RX ADMIN — ROCURONIUM BROMIDE 10 MG: 50 INJECTION, SOLUTION INTRAVENOUS at 08:23

## 2024-11-15 RX ADMIN — ACETAMINOPHEN 650 MG: 325 TABLET, FILM COATED ORAL at 13:34

## 2024-11-15 RX ADMIN — PROPOFOL 20 MG: 10 INJECTION, EMULSION INTRAVENOUS at 09:38

## 2024-11-15 RX ADMIN — KETOROLAC TROMETHAMINE 15 MG: 30 INJECTION, SOLUTION INTRAMUSCULAR at 09:36

## 2024-11-15 RX ADMIN — ACETAMINOPHEN 650 MG: 325 TABLET, FILM COATED ORAL at 16:58

## 2024-11-15 RX ADMIN — OXYCODONE HYDROCHLORIDE 5 MG: 5 TABLET ORAL at 20:16

## 2024-11-15 RX ADMIN — ACETAMINOPHEN 650 MG: 325 TABLET, FILM COATED ORAL at 20:17

## 2024-11-15 RX ADMIN — FENTANYL CITRATE 50 MCG: 50 INJECTION, SOLUTION INTRAMUSCULAR; INTRAVENOUS at 11:34

## 2024-11-15 RX ADMIN — Medication 150 MG: at 09:40

## 2024-11-15 RX ADMIN — ROCURONIUM BROMIDE 50 MG: 50 INJECTION, SOLUTION INTRAVENOUS at 07:31

## 2024-11-15 RX ADMIN — FENTANYL CITRATE 50 MCG: 50 INJECTION INTRAMUSCULAR; INTRAVENOUS at 07:29

## 2024-11-15 RX ADMIN — HYDROMORPHONE HYDROCHLORIDE 0.2 MG: 0.2 INJECTION, SOLUTION INTRAMUSCULAR; INTRAVENOUS; SUBCUTANEOUS at 13:29

## 2024-11-15 RX ADMIN — PROCHLORPERAZINE EDISYLATE 10 MG: 5 INJECTION INTRAMUSCULAR; INTRAVENOUS at 13:29

## 2024-11-15 ASSESSMENT — ACTIVITIES OF DAILY LIVING (ADL)
ADLS_ACUITY_SCORE: 0

## 2024-11-15 ASSESSMENT — LIFESTYLE VARIABLES: TOBACCO_USE: 1

## 2024-11-15 NOTE — ANESTHESIA PREPROCEDURE EVALUATION
Anesthesia Pre-Procedure Evaluation    Patient: Radha Bojorquez   MRN: [id number] : 1990        Procedure : Procedure(s):  RECONSTRUCTION, ABDOMINAL WALL          Past Medical History:   Diagnosis Date    Anemia     Pneumothorax       Past Surgical History:   Procedure Laterality Date    HERNIA REPAIR  2019      No Known Allergies   Social History     Tobacco Use    Smoking status: Former     Types: Cigarettes    Smokeless tobacco: Never   Substance Use Topics    Alcohol use: Not Currently      Wt Readings from Last 1 Encounters:   11/15/24 63.5 kg (140 lb)        Anesthesia Evaluation   Pt has had prior anesthetic.     No history of anesthetic complications       ROS/MED HX  ENT/Pulmonary: Comment: H/O PNEUMOTHORAX    (+)                tobacco use,                     (-) sleep apnea   Neurologic:  - neg neurologic ROS     Cardiovascular:  - neg cardiovascular ROS  (-) hypertension   METS/Exercise Tolerance:     Hematologic:     (+)      anemia,          Musculoskeletal:       GI/Hepatic:  - neg GI/hepatic ROS  (-) GERD   Renal/Genitourinary:  - neg Renal ROS     Endo:  - neg endo ROS     Psychiatric/Substance Use:     (+) psychiatric history depression       Infectious Disease:       Malignancy:       Other:            Physical Exam    Airway        Mallampati: II   TM distance: > 3 FB   Neck ROM: full   Mouth opening: > 3 cm    Respiratory Devices and Support         Dental     Comment: GEMS GLUED ON THE TEETH    (+) Other - document in comments      Cardiovascular   cardiovascular exam normal          Pulmonary   pulmonary exam normal                OUTSIDE LABS:  CBC:   Lab Results   Component Value Date    WBC 4.6 2024    WBC 4.2 10/26/2021    HGB 10.6 (L) 2024    HGB 12.6 10/26/2021    HCT 31.4 (L) 2024    HCT 37.7 10/26/2021     2024     10/26/2021     BMP:   Lab Results   Component Value Date     2024     10/26/2021    POTASSIUM 4.1 2024  "   POTASSIUM 3.5 10/26/2021    CHLORIDE 109 (H) 11/11/2024    CHLORIDE 110 (H) 10/26/2021    CO2 22 11/11/2024    CO2 24 10/26/2021    BUN 11.4 11/11/2024    BUN 13 10/26/2021    CR 0.80 11/11/2024    CR 0.93 10/26/2021    GLC 90 11/11/2024    GLC 96 10/26/2021     COAGS: No results found for: \"PTT\", \"INR\", \"FIBR\"  POC:   Lab Results   Component Value Date    HCG Negative 11/15/2024     HEPATIC:   Lab Results   Component Value Date    ALBUMIN 4.1 10/26/2021    PROTTOTAL 8.4 10/26/2021    ALT 15 10/26/2021    AST 16 10/26/2021    ALKPHOS 63 10/26/2021    BILITOTAL 0.7 10/26/2021     OTHER:   Lab Results   Component Value Date    BERTIN 8.9 11/11/2024    LIPASE 68 (L) 10/26/2021       Anesthesia Plan    ASA Status:  2    NPO Status:  NPO Appropriate    Anesthesia Type: General.     - Airway: ETT   Induction: Intravenous, Propofol.   Maintenance: Balanced.        Consents    Anesthesia Plan(s) and associated risks, benefits, and realistic alternatives discussed. Questions answered and patient/representative(s) expressed understanding.     - Discussed:     - Discussed with:  Patient            Postoperative Care    Pain management: IV analgesics.   PONV prophylaxis: Ondansetron (or other 5HT-3), Background Propofol Infusion     Comments:               Fidencio Borjas MD    I have reviewed the pertinent notes and labs in the chart from the past 30 days and (re)examined the patient.  Any updates or changes from those notes are reflected in this note.                                   "

## 2024-11-15 NOTE — PROVIDER NOTIFICATION
MD Notification    Notified Person: MD    Notified Person Name: Dr. Barone    Notification Date/Time: 1240, 11/15/24    Notification Interaction: page    Purpose of Notification: Pt is very nauseous, prn zofran is ordered but pt received zofran around 9 am. Would it be ok to give early or would you like to order prn compazine?     Orders Received: Verbal order for Compazine 10 mg every 8hrs prn    Comments:

## 2024-11-15 NOTE — LETTER
[facility] GENERAL SURGERY  [address] [place] [address]  Phone: [phone]  Fax: [phone]    November 16, 2024        Radha Bojorquez  [address] [facility] [address]          To whom it may concern:    RE: Radha Bojorquez    Please allow this patient to continue to work from home after her procedure at [facility]     Please contact me for questions or concerns.      Sincerely,      Abilio Barone, *

## 2024-11-15 NOTE — PLAN OF CARE
Summary: POD 0 Abdominal wall reconstruction   Orientation/Cognitive: A/Ox4  Mobility Level/Assist Equipment: Ax1 Gb w/ IV pole  Fall Risk (Y/N): yes  Behavior Concerns: none  Pain Management: prn dilaudid given x1 along with compazine for nausea  Tele/VS/O2: VSS no RA, CAPNO WNL  ABNL Lab/BG: n/a  Diet: full, danitza well  Bowel/Bladder: voiding to BR  Skin Concerns: abdominal incision covered with gauze, bilateral WILMAR drains with scant drainage on dressings  Drains/Devices: PIV SL  Tests/Procedures for next shift: gen surg following  Anticipated DC date & active delays: tbd

## 2024-11-15 NOTE — ANESTHESIA CARE TRANSFER NOTE
Patient: Radha Bojorquez    Procedure: Procedure(s):  RECONSTRUCTION, ABDOMINAL WALL       Diagnosis: Umbilical hernia without obstruction and without gangrene [K42.9]  Diastasis recti [M62.08]  Diagnosis Additional Information: No value filed.    Anesthesia Type:   General     Note:    Oropharynx: oropharynx clear of all foreign objects and spontaneously breathing  Level of Consciousness: drowsy  Oxygen Supplementation: face mask    Independent Airway: airway patency satisfactory and stable  Dentition: dentition unchanged  Vital Signs Stable: post-procedure vital signs reviewed and stable  Report to RN Given: handoff report given  Patient transferred to: PACU    Handoff Report: Identifed the Patient, Identified the Reponsible Provider, Reviewed the pertinent medical history, Discussed the surgical course, Reviewed Intra-OP anesthesia mangement and issues during anesthesia, Set expectations for post-procedure period and Allowed opportunity for questions and acknowledgement of understanding      Vitals:  Vitals Value Taken Time   /76 11/15/24 0951   Temp     Pulse 66 11/15/24 0953   Resp 15 11/15/24 0953   SpO2 100 % 11/15/24 0953   Vitals shown include unfiled device data.    Electronically Signed By: KENTRELL Mathis CRNA  November 15, 2024  9:55 AM

## 2024-11-15 NOTE — OP NOTE
Preoperative diagnosis: Symptomatic rectus diastases with umbilical hernia  Postoperative diagnosis: Same   Procedure: Abdominal wall reconstruction with mesh, elevation of the skin and subcutaneous flaps  Surgeon: Abilio moody M.D.   Asst.: Bonifacio Vargas MD  Anesthesia: Gen. Endotracheal   Estimate blood loss: 10 cc   Drains: Two 15 round WILMAR's in the subcutaneous space   Complications/specimens: None   Indication for procedure: This is a 35 yo female who presented to my office with a symptomatic rectus diastases with umbilical hernia. Her surgical options were thoroughly reviewed. It was elected to proceed with open repair. The potential risks of bleeding, recurrence, infection, prosthetic infection, or chronic pain were all reviewed in detail. The patient wished to proceed.   Procedure: The patient was taken the operating room and placed supine on the operating table. She was then placed under adequate general endotracheal anesthesia. The abdomen was shaved, prepped, draped in the usual sterile fashion. Surgeon initiated Timeout was completed. Appropriate IV antibiotics had been administered.  A transverse lower abdominal incision was made from the ASIS to ASIS in a curvilinear fashion at the level just above the mons pubis. This incision was carried down through the subcutaneous tissues. Upon encountering the abdominal wall fascia, I elevated skin and subcutaneous skin flaps away from this. This was carried up to the level of the xiphoid, to the costal margins, laterally to the anterior axilla line, and inferiorly down to the level of the pubis. In the process of this dissection I mobilized the umbilical skin from the underlying hernia sac.  The diameter of the hernia at the umbilicus measured approximately 1 cm in diameter.  This contained only fatty tissue and was easily reduced.  The hernia sac was excised.  Once this was completed I measured the upper midline separation and this was approximately 5 cm at  its greatest. I then incised along the medial border of the rectus abdominis on both sides. This was carried up to the level of the xiphoid process and down to the level of the symphysis pubis. I then dissected in the retrorectus position. With this dissection completed I placed a large oval shaped Progrip mesh that was 30cm in cranial caudad length and trimmed to a large oval to accommodate the retro-rectus space. This went down to the level of the symphysis pubis and up to the xiphoid process. With the mesh in good position the anterior fascia was closed using running 0 Maxon suture. Hemostasis in the subcutaneous space was adequate. The umbilical skin was tacked back to the fascial closure using a 3-0 Vicryl suture. Two 15 round Wayne-Camacho drains were then placed in the space underneath the skin flap.  These were sutured into position using nylon suture at their skin exit sites.  The deep subcutaneous and deep dermal layers were then reapproximated with 2-0 Vicryl sutures.  The incision was closed with a running subcuticular 4-0 Monocryl stitch. Steri-Strips were applied. The patient tolerated this well. Needle and sponge counts were correct. She was awakened in the operating room, extubated, and taken to the recovery room in stable condition.    Abilio Barone MD

## 2024-11-15 NOTE — DISCHARGE INSTRUCTIONS
Same Day Surgery Discharge Instructions for  Sedation and General Anesthesia     It's not unusual to feel dizzy, light-headed or faint for up to 24 hours after surgery or while taking pain medication.  If you have these symptoms: sit for a few minutes before standing and have someone assist you when you get up to walk or use the bathroom.    You should rest and relax for the next 24 hours. We recommend you make arrangements to have an adult stay with you for at least 24 hours after your discharge.  Avoid hazardous and strenuous activity.    DO NOT DRIVE any vehicle or operate mechanical equipment for 24 hours following the end of your surgery.  Even though you may feel normal, your reactions may be affected by the medication you have received.    Do not drink alcoholic beverages for 24 hours following surgery.     Slowly progress to your regular diet as you feel able. It's not unusual to feel nauseated and/or vomit after receiving anesthesia.  If you develop these symptoms, drink clear liquids (apple juice, ginger ale, broth, 7-up, etc. ) until you feel better.  If your nausea and vomiting persists for 24 hours, please notify your surgeon.      All narcotic pain medications, along with inactivity and anesthesia, can cause constipation. Drinking plenty of liquids and increasing fiber intake will help.    For any questions of a medical nature, call your surgeon.    Do not make important decisions for 24 hours.    If you had general anesthesia, you may have a sore throat for a couple of days related to the breathing tube used during surgery.  You may use Cepacol lozenges to help with this discomfort.  If it worsens or if you develop a fever, contact your surgeon.     If you feel your pain is not well managed with the pain medications prescribed by your surgeon, please contact your surgeon's office to let them know so they can address your concerns.     [facility]  Discharge Instructions:  Post-Operative Open Ventral Hernia Repair    ACTIVITY  Take frequent, short walks and increase your activity gradually.    Avoid strenuous physical activity or heavy lifting greater than 15 lbs. for 4 weeks.  You may climb stairs.  You may drive without restrictions when you are not using any prescription pain medication or muscle relaxant and feel comfortable in a car.  You may return to work/school when you are comfortable without any prescription pain medication.   You may wear an abdominal binder for comfort for 2-3 weeks from your surgery.  You can wash the abdominal binder and dry it on low heat in the dryer.    WOUND CARE  You may remove your outer dressing or Band-Aids and shower 48 hours after the surgery.  Pat your incisions dry and leave them open to air.  Re-apply dressing (Band-Aids or gauze/tape) as needed for comfort or drainage.  You may have steri-strips (looks like white tape) on your incision.  You may peel off the steri-strips 2 weeks after your surgery if they have not peeled off on their own.   Do not soak your incisions in a tub or pool for 2 weeks.   Do not apply any lotions, creams, or ointments to your incisions.  A ridge under your incisions is normal and will gradually resolve.    DIET  Start with liquids, then gradually resume your regular diet as tolerated.   Drink plenty of fluids to stay hydrated.    PAIN  Expect some tenderness and discomfort at the incision site(s).  Use the prescribed pain medication/muscle relaxant at your discretion.  Expect gradual resolution of your pain over several days.  You may take ibuprofen with food (unless you have been told not to) or acetaminophen/Tylenol instead of or in addition to your prescribed pain medication.  However, if you are taking Norco or Percocet, do not take any additional acetaminophen/Tylenol.  Do not drink alcohol or drive while you are taking pain medications or muscle relaxants.  You may apply ice to your incisions in 20 minute  intervals as needed for the next 48 hours.  After that time, consider switching to heat if you prefer.    EXPECTATIONS  Pain medications can cause constipation.  Limit use when possible.  Take an over the counter or prescribed stool softener/stimulant, such as Colace or Senna, 1-2 times a day with plenty of water.  You may take a mild over the counter laxative, such as Miralax or a suppository, as needed.  You may take 1 oz. (2 tablespoons) Milk of Magnesia the evening following surgery to encourage bowel movement.  You make discontinue these medications once you are having regular bowel movements and/or are no longer taking your narcotic pain medication.  You may have shoulder or upper back discomfort due to the gas used in surgery.  This is temporary and should resolve in 48-72 hours.  Short, frequent walks may help with this.  If you are unable to urinate for 8 hours or feel as though you are not emptying your bladder adequately, we recommend you seek care at an ER or Urgent Care facility for possible catheter placement.     RETURN APPOINTMENT  Follow up with your surgeon in 2 weeks.  Please call our office at [phone] to schedule your appointment.  We are located at [address] [place] [address].    CALL OUR OFFICE -[phone] IF YOU HAVE:   Chills or fever above 101 F.  Increased redness or drainage at your incisions.  Significant bleeding.  Pain not relieved by your pain medication or rest.  Increasing pain after the first 48 hours.  Any other concerns or questions.             DRAIN CARE  You have a WILMAR drains on your bilateral lower abdomen. In order to empty this, open the tab/vent on the drain and record how much liquid you remove. To properly close the drain, squeeze the bulb (with tab/vent open) then close the tab while still squeezing the bulb. You should notice that liquid moves in the tubing toward the bulb if it is properly closed. You should also strip the drain tubing  once daily to avoid any clogging. You do this by gently pinching the drain, starting near the skin, and stretching the tubing out this way until you reach the bulb. Do not pull hard on the drain tubing to the point of pulling the drain away from the skin.

## 2024-11-15 NOTE — ANESTHESIA POSTPROCEDURE EVALUATION
Patient: Radha Bojorquez    Procedure: Procedure(s):  RECONSTRUCTION, ABDOMINAL WALL       Anesthesia Type:  General    Note:     Postop Pain Control: Uneventful            Sign Out: Well controlled pain   PONV: No   Neuro/Psych: Uneventful            Sign Out: Acceptable/Baseline neuro status   Airway/Respiratory: Uneventful            Sign Out: Acceptable/Baseline resp. status   CV/Hemodynamics: Uneventful            Sign Out: Acceptable CV status; No obvious hypovolemia; No obvious fluid overload   Other NRE: NONE   DID A NON-ROUTINE EVENT OCCUR? No           Last vitals:  Vitals Value Taken Time   /74 11/15/24 1100   Temp 36.4  C (97.5  F) 11/15/24 0951   Pulse 59 11/15/24 1113   Resp 32 11/15/24 1114   SpO2 98 % 11/15/24 1114   Vitals shown include unfiled device data.    Electronically Signed By: Fidencio Borjas MD  November 15, 2024  11:15 AM

## 2024-11-15 NOTE — ANESTHESIA PROCEDURE NOTES
Airway       Patient location during procedure: OR       Procedure Start/Stop Times: 11/15/2024 7:35 AM  Staff -        Anesthesiologist:  Fidencio Borjas MD       CRNA: Jennifer Godinez APRN CRNA       Performed By: CRNA  Consent for Airway        Urgency: elective  Indications and Patient Condition       Indications for airway management: jessie-procedural       Induction type:intravenous       Mask difficulty assessment: 1 - vent by mask    Final Airway Details       Final airway type: endotracheal airway       Successful airway: ETT - single  Endotracheal Airway Details        ETT size (mm): 7.0       Cuffed: yes       Successful intubation technique: video laryngoscopy       VL Blade Size: Glidescope 3       Grade View of Cords: 1       Adjucts: stylet       Position: Left       Secured at (cm): 22       Bite block used: None    Post intubation assessment        Placement verified by: capnometry, equal breath sounds and chest rise        Number of attempts at approach: 1       Secured with: commercial tube rashid and tape       Ease of procedure: easy       Dentition: Intact and Unchanged    Medication(s) Administered   Medication Administration Time: 11/15/2024 7:35 AM

## 2024-11-16 VITALS
DIASTOLIC BLOOD PRESSURE: 70 MMHG | SYSTOLIC BLOOD PRESSURE: 118 MMHG | HEIGHT: 69 IN | WEIGHT: 140 LBS | BODY MASS INDEX: 20.73 KG/M2 | HEART RATE: 71 BPM | OXYGEN SATURATION: 100 % | RESPIRATION RATE: 16 BRPM | TEMPERATURE: 98.7 F

## 2024-11-16 LAB
ANION GAP SERPL CALCULATED.3IONS-SCNC: 7 MMOL/L (ref 7–15)
BUN SERPL-MCNC: 8 MG/DL (ref 6–20)
CALCIUM SERPL-MCNC: 8.6 MG/DL (ref 8.8–10.4)
CHLORIDE SERPL-SCNC: 107 MMOL/L (ref 98–107)
CREAT SERPL-MCNC: 0.86 MG/DL (ref 0.51–0.95)
EGFRCR SERPLBLD CKD-EPI 2021: 89 ML/MIN/1.73M2
ERYTHROCYTE [DISTWIDTH] IN BLOOD BY AUTOMATED COUNT: 14.2 % (ref 10–15)
GLUCOSE SERPL-MCNC: 99 MG/DL (ref 70–99)
HCO3 SERPL-SCNC: 25 MMOL/L (ref 22–29)
HCT VFR BLD AUTO: 27.7 % (ref 35–47)
HGB BLD-MCNC: 9.9 G/DL (ref 11.7–15.7)
MCH RBC QN AUTO: 28 PG (ref 26.5–33)
MCHC RBC AUTO-ENTMCNC: 35.7 G/DL (ref 31.5–36.5)
MCV RBC AUTO: 79 FL (ref 78–100)
PLATELET # BLD AUTO: 191 10E3/UL (ref 150–450)
POTASSIUM SERPL-SCNC: 4 MMOL/L (ref 3.4–5.3)
RBC # BLD AUTO: 3.53 10E6/UL (ref 3.8–5.2)
SODIUM SERPL-SCNC: 139 MMOL/L (ref 135–145)
WBC # BLD AUTO: 8 10E3/UL (ref 4–11)

## 2024-11-16 PROCEDURE — 250N000013 HC RX MED GY IP 250 OP 250 PS 637: Performed by: STUDENT IN AN ORGANIZED HEALTH CARE EDUCATION/TRAINING PROGRAM

## 2024-11-16 PROCEDURE — 85027 COMPLETE CBC AUTOMATED: CPT | Performed by: STUDENT IN AN ORGANIZED HEALTH CARE EDUCATION/TRAINING PROGRAM

## 2024-11-16 PROCEDURE — 80048 BASIC METABOLIC PNL TOTAL CA: CPT | Performed by: STUDENT IN AN ORGANIZED HEALTH CARE EDUCATION/TRAINING PROGRAM

## 2024-11-16 PROCEDURE — 250N000013 HC RX MED GY IP 250 OP 250 PS 637: Performed by: SURGERY

## 2024-11-16 PROCEDURE — 36415 COLL VENOUS BLD VENIPUNCTURE: CPT | Performed by: STUDENT IN AN ORGANIZED HEALTH CARE EDUCATION/TRAINING PROGRAM

## 2024-11-16 RX ORDER — AMOXICILLIN 250 MG
1 CAPSULE ORAL AT BEDTIME
Qty: 40 TABLET | Refills: 1 | Status: SHIPPED | OUTPATIENT
Start: 2024-11-16

## 2024-11-16 RX ORDER — OXYCODONE HYDROCHLORIDE 5 MG/1
5 TABLET ORAL EVERY 4 HOURS PRN
Qty: 20 TABLET | Refills: 0 | Status: SHIPPED | OUTPATIENT
Start: 2024-11-16

## 2024-11-16 RX ORDER — METHOCARBAMOL 500 MG/1
500 TABLET, FILM COATED ORAL 4 TIMES DAILY PRN
Qty: 30 TABLET | Refills: 1 | Status: SHIPPED | OUTPATIENT
Start: 2024-11-16

## 2024-11-16 RX ADMIN — ACETAMINOPHEN 650 MG: 325 TABLET, FILM COATED ORAL at 05:00

## 2024-11-16 RX ADMIN — OXYCODONE HYDROCHLORIDE 5 MG: 5 TABLET ORAL at 00:56

## 2024-11-16 RX ADMIN — ACETAMINOPHEN 650 MG: 325 TABLET, FILM COATED ORAL at 00:56

## 2024-11-16 RX ADMIN — Medication 1 LOZENGE: at 05:09

## 2024-11-16 RX ADMIN — ACETAMINOPHEN 650 MG: 325 TABLET, FILM COATED ORAL at 14:11

## 2024-11-16 RX ADMIN — ACETAMINOPHEN 650 MG: 325 TABLET, FILM COATED ORAL at 09:16

## 2024-11-16 RX ADMIN — Medication 1 LOZENGE: at 00:58

## 2024-11-16 ASSESSMENT — ACTIVITIES OF DAILY LIVING (ADL)
ADLS_ACUITY_SCORE: 0

## 2024-11-16 NOTE — PLAN OF CARE
Date & Time: 1900-0700  Surgery/POD#: POD 1 from abdominal wall reconstruction   Behavior & Aggression: Green  Fall Risk: Yes   Orientation: A&Ox4  ABNL VS/O2:VSS on RA  Pain Management: PRN oxy, scheduled tylenol, and ice packs   Bowel/Bladder: Continent. Voiding adequately. Passing gas, no BM during shift   Drains: PIV SL. R/L WILMAR drains   Wounds/incisions: Abdominal incision covered with gauze. R/L WILMAR drain sites   Diet: Tolerating regular diet, denies N/V  Activity Level: SBA  Anticipated  DC Date: Pending

## 2024-11-16 NOTE — PLAN OF CARE
Goal Outcome Evaluation:       Date & Time: 11/16/[id number]  Surgery/POD#: 1   Behavior & Aggression: green and positive   Fall Risk: no concern   Orientation: A/O x4 calm/pleasant, denies short of breath, chest tightness, abdominal pain general discomfort, nausea, vomiting and fever.   ABNL VS/O2: vs's on R A   ABNL Labs:  none   Pain Management:  tylenol and oxycodone   Bowel/Bladder:  continent   Drains: discharging  x2 WILMAR with home   Wounds/incisions abdominal incision intact, dry and clean   Diet: regular diet with good appetite   Activity Level: Independent   Tests/Procedures:  none   Anticipated  DC Date:  discharging  home soon with her family   Significant Information:

## 2024-11-16 NOTE — PROGRESS NOTES
"General Surgery Progress Note    Subjective: Radha is doing well. Pain better today. Passing gas, no bm. No nausea.     Objective: /70 (BP Location: Right arm)   Pulse 71   Temp 98.7  F (37.1  C) (Oral)   Resp 16   Ht 1.753 m (5' 9\")   Wt 63.5 kg (140 lb)   LMP 11/14/2024   SpO2 100%   BMI 20.67 kg/m    Gen: sitting up in bed, appears well.   CV: RRR  Pulm: nonlabored breathing  Abd: dressings in place are c/d/I, WILMAR serosanguinous bilaterally  Ext: WWP    Assessment/Plan:   Radha Bojorquez  is a 34 year old female POD 1 s/p repair of rectus diastasis and umbilical hernia with Dr. Barone. Doing well.   - home today  - drain teaching  - will discharge with WILMAR drains  - follow up with Dr. Barone next week for removal    Aliyah Gross MD  [facility]  [phone]            "

## 2024-11-18 ENCOUNTER — TELEPHONE (OUTPATIENT)
Dept: SURGERY | Facility: CLINIC | Age: 34
End: 2024-11-18
Payer: COMMERCIAL

## 2024-11-18 NOTE — TELEPHONE ENCOUNTER
Procedure: abdominal wall reconstruction with mesh, elevation of the skin and subcutaneous flaps    Date: 11/15/24    Surgeon: Abisai    Patient calling to discuss drains. Discharge instructions say to follow up this week for possible drain removal    She has two drains in place. One is draining, 30 ml or less each day. The second one is draining ~90+ mls each day. This one, is also draining around the drain insertion site.  Gauze is becoming saturated. Changing 1 - 2 times a day    Informed her that I will discuss with PA team to see if one of them can see her tomorrow to at least evaluate drains. Otherwise, will have to squeeze her into Dr. Barone's clinic on Thursday.     Will call her back after discussing.     She is in agreement with this plan    Deja Us RN-BSN

## 2024-11-18 NOTE — TELEPHONE ENCOUNTER
Per Wiley Steel PA-C: patient should see Dr. Barone.     Scheduled her to see Dr. Barone this week, Thursday (21st) at 11:30 am. She is aware that she is double booked into a full clinic, so she may have to wait a bit to be seen.    She will call INA Us RN-BSN

## 2024-11-18 NOTE — TELEPHONE ENCOUNTER
Name of caller: Patient    Reason for Call:  call back    Would like to discuss when she might be able to have her drain removed.     Surgeon:  Abilio Barone MD    Recent Surgery:  Yes.    If yes, when & what type:  11/15/24    RECONSTRUCTION, ABDOMINAL WALL       Best phone number to reach pt at is: [phone]    Ok to leave a message with medical info? Yes.

## 2024-11-21 ENCOUNTER — OFFICE VISIT (OUTPATIENT)
Dept: SURGERY | Facility: CLINIC | Age: 34
End: 2024-11-21
Payer: COMMERCIAL

## 2024-11-21 DIAGNOSIS — Z09 SURGERY FOLLOW-UP EXAMINATION: Primary | ICD-10-CM

## 2024-11-21 RX ORDER — ACETAMINOPHEN 500 MG
500-1000 TABLET ORAL EVERY 6 HOURS PRN
COMMUNITY

## 2024-11-21 NOTE — PROGRESS NOTES
Patient returns in follow-up 1 week postop after undergoing abdominal wall reconstruction for symptomatic diastases and umbilical hernia.  Overall she seems to be doing well.  She is not utilizing prescription strength narcotics.  She is using Tylenol and ice packs for pain control.  She has been up ambulating.  She denies fevers or chills.  Reports her bowel function is at baseline.    On examination: Her incision is clean dry and intact, Steri-Strips are in place.  There is some superficial ecchymosis along the superior margin of the skin incision.  There is no hematoma or seroma.  WILMAR drains were removed without complication.    Overall she is doing well.  Needs to maintain very low level activity.  Okay to continue to wear abdominal binder.  Requested that she follow-up again in approximately 10 days for recheck.

## 2024-12-02 ENCOUNTER — OFFICE VISIT (OUTPATIENT)
Dept: SURGERY | Facility: CLINIC | Age: 34
End: 2024-12-02
Payer: COMMERCIAL

## 2024-12-02 DIAGNOSIS — Z09 SURGERY FOLLOW-UP EXAMINATION: Primary | ICD-10-CM

## 2024-12-12 ENCOUNTER — TELEPHONE (OUTPATIENT)
Dept: SURGERY | Facility: CLINIC | Age: 34
End: 2024-12-12
Payer: COMMERCIAL

## 2024-12-12 NOTE — TELEPHONE ENCOUNTER
"-- Message is from the [employer]--    Provider paged via [address] - The below message was copied and pasted from a [employer] page:    [phone] FROM: [facility] RE: [address]: [address], HOWEVER WILLIE STATED THEY USUALLY DO A 2 VIEW FOR SCOLIOSIS. DR. Gilman Borne ORDER 1 VIEW THEY NEED TO CONFIRM IF THAT IS CORRECT IF SO WOULD IT BE A ""PA OR LATERAL. \"" CAN THE ON CALL PROVIDER CONFIRM INFORMATION.  Kacie Minaya       " Procedure: Abdominal wall reconstruction with mesh, elevation of the skin and subcutaneous flaps     Date: 11/15/24    Surgeon: Abisai    Patient reports that she removed her abdominal binder last night and there was a lot of blood in one specific area. It is the area of the incisions that Dr. Barone had mentioned something about during her post op on the 2nd (no dictation yet).  She reports he gave her bacitracin and gauze and told her to use that under the abdominal binder    The bleeding has slowed down, but is still occurring.      Scheduled patient to see PA tomorrow (Friday) in clinic at 10:00 am, checking in at 9:45 (per Kahlil Mendes PA-C)    Deja Us, RN-BSN

## 2024-12-12 NOTE — TELEPHONE ENCOUNTER
Name of caller: Patient    Reason for Call:  symptoms  Incision/cut area is bleeding. Last night was bleeding a lot , still bleeding now. Needs to know what to do.     Surgeon:  Abilio Barone MD    Recent Surgery:  Yes.    If yes, when & what type:  11/15/2024    RECONSTRUCTION, ABDOMINAL WALL       Best phone number to reach pt at is: [phone]    Ok to leave a message with medical info? Yes.

## 2024-12-16 NOTE — PROGRESS NOTES
IUD Insertion:  CONSULT:    Is a pregnancy test required: Yes.  Was it positive or negative?  Negative  Done before surgery - not sexually active since.   Was a consent obtained?  Yes    Subjective: Radha Bojorquez is a 34 year old No obstetric history on file. presents for IUD and desires Mirena type IUD.  She is also due for a pap and would like STI testing.      Patient has been given the opportunity to ask questions about all forms of birth control, including all options appropriate for Radha Bojorquez. Discussed that no method of birth control, except abstinence is 100% effective against pregnancy or sexually transmitted infection.     Radha Bojorquez understands she may have the IUD removed at any time. IUD should be removed by a health care provider.    The entire insertion procedure was reviewed with the patient, including care after placement.    Patient's last menstrual period was 11/14/2024. Has current contraception. No allergy to betadine or shellfish. Patient desires STD screening  hCG Urine Qualitative   Date Value Ref Range Status   11/15/2024 Negative Negative Final     Comment:     This test is for screening purposes.  Results should be interpreted along with the clinical picture.  Confirmation testing is available if warranted by ordering [id number], HCG Quantitative Pregnancy.         LMP 11/14/2024     Pelvic Exam:   EG/BUS: normal genital architecture without lesions, erythema or abnormal secretions.   Vagina: moist, pink, rugae with physiologic discharge and secretions  Cervix: parous no lesions and pink, moist, closed, without lesion or CMT  Uterus:  mobile, no pain  Adnexa: within normal limits and no masses, nodularity, tenderness    PROCEDURE NOTE: -- IUD Insertion    Reason for Insertion: contraception      Under sterile technique, cervix was visualized with speculum - pap and GC/CT collected   and prepped with Betadine solution swab x 3. Tenaculum was placed for stability. The uterus was  gently straightened and sounded to 7.0 cm. IUD prepared for placement, and IUD inserted according to 's instructions without difficulty or significant resitance, and deployed at the fundus. The strings were visualized and trimmed to 2.0 cm from the external os. Tenaculum was removed and hemostasis noted. Speculum removed.  Patient tolerated procedure well.    EBL: minimal    Complications: none    ASSESSMENT:     ICD-10-CM    1. Pre-procedure lab exam  Z01.812       2. Encounter for insertion of intrauterine contraceptive device  Z30.430       3. Cervical cancer screening  Z12.4 HPV and Gynecologic Cytology Panel - Recommended Age 30-65 Years      4. Screening for chlamydial disease  Z11.8 Chlamydia trachomatis/Neisseria gonorrhoeae by PCR - Clinic Collect           PLAN:    Given 's handouts, including when to have IUD removed, list of danger s/sx, side effects and follow up recommended. Encouraged condom use for prevention of STD. Back up contraception advised for 7 days if progestin method. Advised to call for any fever, for prolonged or severe pain or bleeding, abnormal vaginal discharge, or unable to palpate strings. She was advised to use pain medications (ibuprofen) as needed for mild to moderate pain. Advised to follow-up in clinic in 4-6 weeks for IUD string check if unable to find strings or as directed by provider.     Xuan Tapia CNM       5

## 2024-12-18 ENCOUNTER — OFFICE VISIT (OUTPATIENT)
Dept: OBGYN | Facility: CLINIC | Age: 34
End: 2024-12-18
Payer: COMMERCIAL

## 2024-12-18 VITALS
BODY MASS INDEX: 22.36 KG/M2 | WEIGHT: 151 LBS | SYSTOLIC BLOOD PRESSURE: 108 MMHG | HEIGHT: 69 IN | DIASTOLIC BLOOD PRESSURE: 60 MMHG

## 2024-12-18 DIAGNOSIS — Z97.5 IUD (INTRAUTERINE DEVICE) IN PLACE: ICD-10-CM

## 2024-12-18 DIAGNOSIS — Z01.812 PRE-PROCEDURE LAB EXAM: Primary | ICD-10-CM

## 2024-12-18 DIAGNOSIS — Z30.430 ENCOUNTER FOR INSERTION OF INTRAUTERINE CONTRACEPTIVE DEVICE: ICD-10-CM

## 2024-12-18 DIAGNOSIS — Z11.8 SCREENING FOR CHLAMYDIAL DISEASE: ICD-10-CM

## 2024-12-18 DIAGNOSIS — Z12.4 CERVICAL CANCER SCREENING: ICD-10-CM

## 2024-12-18 NOTE — NURSING NOTE
"Chief Complaint   Patient presents with    IUD     Mirena and pap        Initial /60   Ht 1.753 m (5' 9\")   Wt 68.5 kg (151 lb)   LMP 12/08/2024   BMI 22.30 kg/m   Estimated body mass index is 22.3 kg/m  as calculated from the following:    Height as of this encounter: 1.753 m (5' 9\").    Weight as of this encounter: 68.5 kg (151 lb).  BP completed using cuff size: regular    Questioned patient about current smoking habits.  Pt. quit smoking some time ago.      No obstetric history on file.    The following HM Due: pap smear  Chuy (13-24)      Gilda Bishop CMA on 12/18/2024 at 1:34 PM    "

## 2024-12-19 ENCOUNTER — OFFICE VISIT (OUTPATIENT)
Dept: SURGERY | Facility: CLINIC | Age: 34
End: 2024-12-19
Payer: COMMERCIAL

## 2024-12-19 DIAGNOSIS — Z09 SURGERY FOLLOW-UP EXAMINATION: Primary | ICD-10-CM

## 2024-12-19 LAB
C TRACH DNA SPEC QL PROBE+SIG AMP: NEGATIVE
HPV HR 12 DNA CVX QL NAA+PROBE: NEGATIVE
HPV16 DNA CVX QL NAA+PROBE: NEGATIVE
HPV18 DNA CVX QL NAA+PROBE: NEGATIVE
HUMAN PAPILLOMA VIRUS FINAL DIAGNOSIS: NORMAL
N GONORRHOEA DNA SPEC QL NAA+PROBE: NEGATIVE

## 2024-12-19 NOTE — LETTER
December 19, 2024          Chad A Trierweiler, MD  [facility]  [address]  [address]  [place] [address]      RE:   Radha Bojorquez 1990      Dear Colleague,    Thank you for referring your patient, Radha Bojorquez, to [facility] [place]. Please see a copy of my visit note below.    Ongoing follow-up after abdominal wall reconstruction last month.  States that she is doing well.  Some degree of ongoing soreness in the abdominal wall.  She is increasing activity.  Has had minimal ongoing drainage from her incision.  No fevers or chills.  Has had some nausea but reports normal otherwise GI function.     On examination: The area of superficial skin sloughing has basically completely reepithelialized.  There is no erythema or cellulitis.  No evidence of infection.  Some palpable seroma in the upper abdomen.  Good approximation of the abdominal wall muscles.     Overall progressing and recovering.  Discussed ongoing activity restrictions.  Follow-up again in 1 month.    Again, thank you for allowing me to participate in the care of your patient.      Sincerely,      Abilio Barone MD

## 2024-12-19 NOTE — PROGRESS NOTES
Ongoing follow-up after abdominal wall reconstruction last month.  States that she is doing well.  Some degree of ongoing soreness in the abdominal wall.  She is increasing activity.  Has had minimal ongoing drainage from her incision.  No fevers or chills.  Has had some nausea but reports normal otherwise GI function.    On examination: The area of superficial skin sloughing has basically completely reepithelialized.  There is no erythema or cellulitis.  No evidence of infection.  Some palpable seroma in the upper abdomen.  Good approximation of the abdominal wall muscles.    Overall progressing and recovering.  Discussed ongoing activity restrictions.  Follow-up again in 1 month.

## 2024-12-19 NOTE — PROGRESS NOTES
Patient presents in ongoing follow-up after recent abdominal wall reconstruction.  Still feeling mild discomfort.  Some degree of limitation with activity and exercise.  Feels tight.  No fevers or chills.    On examination: No evidence of hematoma or seroma.  Some superficial skin sloughing at the skin flap area.  No erythema or cellulitis.    We discussed wound care.  We discussed advancement of activity and gentle stretching exercises.  Her questions were answered.  She will follow-up again in 4 to 6 weeks.

## 2024-12-23 ENCOUNTER — TELEPHONE (OUTPATIENT)
Dept: SURGERY | Facility: CLINIC | Age: 34
End: 2024-12-23
Payer: COMMERCIAL

## 2024-12-23 NOTE — TELEPHONE ENCOUNTER
Procedure: abdominal wall reconstruction with mesh, elevation of the skin and subcutaneous flaps    Date: 11/15/24    Surgeon: Abisai    Patient requesting to remain off work until she sees Dr. Barone for her next follow up on 1/20/2025.    She reports she is still having draining from the wound, still sore, and does not feel she can appropriately perform the duties of her job. She does not do any lifting at work, but does have to use stairs. 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 Barone did inform her that this is not likely related to her surgery    Informed her that because Dr. Barone cleared her to return on December 28th, I will have to discuss with him prior to providing any further extensions    Also suggested she contact her PCP to discuss headaches and ongoing nausea as Dr. Barone does not feel this is related to surgery.  She agreed    She will wait for call back regarding extension for return to work. She knows that she may not get call back until Thursday (26th).    Deja Us, JULIETTE-BSN

## 2024-12-23 NOTE — TELEPHONE ENCOUNTER
Name of caller: Patient    Reason for Call:    Patient would like to extend her GABRIELA until she sees Dr. Barone on 1/20/24    Surgeon:  Abilio Barone MD    Recent Surgery:  Yes.    If yes, when & what type:      ABDOMINAL WALL RECONSTRUCTION  Best phone number to reach pt at is:     [phone]    Ok to leave a message with medical info? Yes.    Pharmacy preferred (if calling for a refill):

## 2024-12-23 NOTE — LETTER
24    Re: Radha Bojorquez  : 1990      To Whom it May Concern:    Radha Bojorquez is under the professional care of [facility] for recent surgery and follow up care(s). Due to a setback in recovery, she needs to remain off work until after she has her next post operative evaluation on 2025.  She can return to work, without any restrictions on 2025.    Please do not hesitate to contact us at [phone] with any questions or concerns regarding this matter.  If additional documentation/paperwork is needed to be filled out signed by provider, our fax number is: [phone]      Regards,        Deja Us RN-BSN  Abilio Barone MD

## 2024-12-26 LAB
BKR AP ASSOCIATED HPV REPORT: NORMAL
BKR LAB AP GYN ADEQUACY: NORMAL
BKR LAB AP GYN INTERPRETATION: NORMAL
BKR LAB AP LMP: NORMAL
BKR LAB AP PREVIOUS ABNORMAL: NORMAL
PATH REPORT.COMMENTS IMP SPEC: NORMAL
PATH REPORT.COMMENTS IMP SPEC: NORMAL
PATH REPORT.RELEVANT HX SPEC: NORMAL

## 2024-12-26 NOTE — TELEPHONE ENCOUNTER
Attempted to call patient. No answer. Left message informing her Dr. Barone has approved her to remain off work until her next appointment. Return to work date is now 1/21/2025 with NO restrictions.    Original paperwork has already been sent down to scanning/medical records. If her employer needs actual forms updated, they will need to send a new set of forms to be filled out    I will type up a letter and send it to patient's MyChart    Call back number provided and patient encouraged to call or MyChart with any questions or concerns. Fax number also provided    Deja Us RN-BSN

## 2024-12-28 ENCOUNTER — HOSPITAL ENCOUNTER (EMERGENCY)
Facility: CLINIC | Age: 34
Discharge: HOME | End: 2024-12-29
Attending: EMERGENCY MEDICINE
Payer: COMMERCIAL

## 2024-12-28 ENCOUNTER — OFFICE VISIT (OUTPATIENT)
Dept: URGENT CARE | Facility: URGENT CARE | Age: 34
End: 2024-12-28
Payer: COMMERCIAL

## 2024-12-28 ENCOUNTER — APPOINTMENT (OUTPATIENT)
Dept: CT IMAGING | Facility: CLINIC | Age: 34
End: 2024-12-28
Attending: EMERGENCY MEDICINE
Payer: COMMERCIAL

## 2024-12-28 VITALS
BODY MASS INDEX: 22.15 KG/M2 | WEIGHT: 150 LBS | SYSTOLIC BLOOD PRESSURE: 113 MMHG | HEART RATE: 67 BPM | OXYGEN SATURATION: 99 % | RESPIRATION RATE: 24 BRPM | DIASTOLIC BLOOD PRESSURE: 82 MMHG | TEMPERATURE: 98.5 F

## 2024-12-28 VITALS
TEMPERATURE: 98.5 F | SYSTOLIC BLOOD PRESSURE: 109 MMHG | HEART RATE: 68 BPM | DIASTOLIC BLOOD PRESSURE: 73 MMHG | RESPIRATION RATE: 24 BRPM | OXYGEN SATURATION: 100 %

## 2024-12-28 DIAGNOSIS — R07.9 LEFT-SIDED CHEST PAIN: ICD-10-CM

## 2024-12-28 DIAGNOSIS — R07.9 EXERTIONAL CHEST PAIN: Primary | ICD-10-CM

## 2024-12-28 LAB
ALBUMIN SERPL BCG-MCNC: 3.9 G/DL (ref 3.5–5.2)
ALP SERPL-CCNC: 75 U/L (ref 40–150)
ALT SERPL W P-5'-P-CCNC: 7 U/L (ref 0–50)
ANION GAP SERPL CALCULATED.3IONS-SCNC: 9 MMOL/L (ref 7–15)
AST SERPL W P-5'-P-CCNC: 15 U/L (ref 0–45)
BASOPHILS # BLD AUTO: 0 10E3/UL (ref 0–0.2)
BASOPHILS NFR BLD AUTO: 0 %
BILIRUB SERPL-MCNC: 0.5 MG/DL
BUN SERPL-MCNC: 9.3 MG/DL (ref 6–20)
CALCIUM SERPL-MCNC: 8.4 MG/DL (ref 8.8–10.4)
CHLORIDE SERPL-SCNC: 106 MMOL/L (ref 98–107)
CREAT SERPL-MCNC: 0.79 MG/DL (ref 0.51–0.95)
D DIMER PPP FEU-MCNC: 0.74 UG/ML FEU (ref 0–0.5)
EGFRCR SERPLBLD CKD-EPI 2021: >90 ML/MIN/1.73M2
EOSINOPHIL # BLD AUTO: 0.1 10E3/UL (ref 0–0.7)
EOSINOPHIL NFR BLD AUTO: 1 %
ERYTHROCYTE [DISTWIDTH] IN BLOOD BY AUTOMATED COUNT: 14.6 % (ref 10–15)
GLUCOSE SERPL-MCNC: 82 MG/DL (ref 70–99)
HCG SERPL QL: NEGATIVE
HCO3 SERPL-SCNC: 24 MMOL/L (ref 22–29)
HCT VFR BLD AUTO: 29.9 % (ref 35–47)
HGB BLD-MCNC: 10.6 G/DL (ref 11.7–15.7)
HOLD SPECIMEN: NORMAL
IMM GRANULOCYTES # BLD: 0 10E3/UL
IMM GRANULOCYTES NFR BLD: 0 %
LYMPHOCYTES # BLD AUTO: 1.8 10E3/UL (ref 0.8–5.3)
LYMPHOCYTES NFR BLD AUTO: 33 %
MCH RBC QN AUTO: 28 PG (ref 26.5–33)
MCHC RBC AUTO-ENTMCNC: 35.5 G/DL (ref 31.5–36.5)
MCV RBC AUTO: 79 FL (ref 78–100)
MONOCYTES # BLD AUTO: 0.3 10E3/UL (ref 0–1.3)
MONOCYTES NFR BLD AUTO: 6 %
NEUTROPHILS # BLD AUTO: 3.1 10E3/UL (ref 1.6–8.3)
NEUTROPHILS NFR BLD AUTO: 59 %
NRBC # BLD AUTO: 0 10E3/UL
NRBC BLD AUTO-RTO: 0 /100
PLATELET # BLD AUTO: 228 10E3/UL (ref 150–450)
POTASSIUM SERPL-SCNC: 3.8 MMOL/L (ref 3.4–5.3)
PROT SERPL-MCNC: 7 G/DL (ref 6.4–8.3)
RBC # BLD AUTO: 3.78 10E6/UL (ref 3.8–5.2)
SODIUM SERPL-SCNC: 139 MMOL/L (ref 135–145)
TROPONIN T SERPL HS-MCNC: <6 NG/L
WBC # BLD AUTO: 5.3 10E3/UL (ref 4–11)

## 2024-12-28 PROCEDURE — 71275 CT ANGIOGRAPHY CHEST: CPT

## 2024-12-28 PROCEDURE — 85025 COMPLETE CBC W/AUTO DIFF WBC: CPT | Performed by: EMERGENCY MEDICINE

## 2024-12-28 PROCEDURE — 250N000011 HC RX IP 250 OP 636: Performed by: EMERGENCY MEDICINE

## 2024-12-28 PROCEDURE — 85018 HEMOGLOBIN: CPT | Performed by: SOCIAL WORKER

## 2024-12-28 PROCEDURE — 93000 ELECTROCARDIOGRAM COMPLETE: CPT | Performed by: EMERGENCY MEDICINE

## 2024-12-28 PROCEDURE — 36415 COLL VENOUS BLD VENIPUNCTURE: CPT | Performed by: EMERGENCY MEDICINE

## 2024-12-28 PROCEDURE — 250N000011 HC RX IP 250 OP 636: Performed by: SOCIAL WORKER

## 2024-12-28 PROCEDURE — 250N000009 HC RX 250: Performed by: EMERGENCY MEDICINE

## 2024-12-28 PROCEDURE — 93005 ELECTROCARDIOGRAM TRACING: CPT

## 2024-12-28 PROCEDURE — 99285 EMERGENCY DEPT VISIT HI MDM: CPT | Mod: 25

## 2024-12-28 PROCEDURE — 85379 FIBRIN DEGRADATION QUANT: CPT | Performed by: EMERGENCY MEDICINE

## 2024-12-28 PROCEDURE — 80053 COMPREHEN METABOLIC PANEL: CPT | Performed by: EMERGENCY MEDICINE

## 2024-12-28 PROCEDURE — 85025 COMPLETE CBC W/AUTO DIFF WBC: CPT | Performed by: SOCIAL WORKER

## 2024-12-28 PROCEDURE — 96374 THER/PROPH/DIAG INJ IV PUSH: CPT

## 2024-12-28 PROCEDURE — 96375 TX/PRO/DX INJ NEW DRUG ADDON: CPT

## 2024-12-28 PROCEDURE — 36415 COLL VENOUS BLD VENIPUNCTURE: CPT | Performed by: SOCIAL WORKER

## 2024-12-28 PROCEDURE — 84703 CHORIONIC GONADOTROPIN ASSAY: CPT | Performed by: EMERGENCY MEDICINE

## 2024-12-28 PROCEDURE — 250N000013 HC RX MED GY IP 250 OP 250 PS 637: Performed by: EMERGENCY MEDICINE

## 2024-12-28 PROCEDURE — 99215 OFFICE O/P EST HI 40 MIN: CPT | Mod: 24 | Performed by: EMERGENCY MEDICINE

## 2024-12-28 PROCEDURE — 84484 ASSAY OF TROPONIN QUANT: CPT | Performed by: EMERGENCY MEDICINE

## 2024-12-28 RX ORDER — ACETAMINOPHEN 500 MG
1000 TABLET ORAL ONCE
Status: COMPLETED | OUTPATIENT
Start: 2024-12-28 | End: 2024-12-28

## 2024-12-28 RX ORDER — IOPAMIDOL 755 MG/ML
59 INJECTION, SOLUTION INTRAVASCULAR ONCE
Status: COMPLETED | OUTPATIENT
Start: 2024-12-28 | End: 2024-12-28

## 2024-12-28 RX ORDER — ONDANSETRON 2 MG/ML
4 INJECTION INTRAMUSCULAR; INTRAVENOUS ONCE
Status: COMPLETED | OUTPATIENT
Start: 2024-12-28 | End: 2024-12-28

## 2024-12-28 RX ORDER — LIDOCAINE 4 G/G
1 PATCH TOPICAL ONCE
Status: DISCONTINUED | OUTPATIENT
Start: 2024-12-28 | End: 2024-12-29 | Stop reason: HOSPADM

## 2024-12-28 RX ORDER — KETOROLAC TROMETHAMINE 15 MG/ML
15 INJECTION, SOLUTION INTRAMUSCULAR; INTRAVENOUS ONCE
Status: COMPLETED | OUTPATIENT
Start: 2024-12-28 | End: 2024-12-28

## 2024-12-28 RX ADMIN — ONDANSETRON 4 MG: 2 INJECTION, SOLUTION INTRAMUSCULAR; INTRAVENOUS at 18:58

## 2024-12-28 RX ADMIN — KETOROLAC TROMETHAMINE 15 MG: 15 INJECTION, SOLUTION INTRAMUSCULAR; INTRAVENOUS at 21:30

## 2024-12-28 RX ADMIN — ACETAMINOPHEN 1000 MG: 500 TABLET, FILM COATED ORAL at 21:30

## 2024-12-28 RX ADMIN — LIDOCAINE 1 PATCH: 4 PATCH TOPICAL at 21:30

## 2024-12-28 RX ADMIN — IOPAMIDOL 59 ML: 755 INJECTION, SOLUTION INTRAVENOUS at 22:30

## 2024-12-28 RX ADMIN — SODIUM CHLORIDE 86 ML: 9 INJECTION, SOLUTION INTRAVENOUS at 22:30

## 2024-12-28 ASSESSMENT — ACTIVITIES OF DAILY LIVING (ADL)
ADLS_ACUITY_SCORE: 44

## 2024-12-28 NOTE — PROGRESS NOTES
"Assessment & Plan     Diagnosis:    ICD-10-CM    1. Exertional chest pain  R07.9 EKG 12-lead complete w/read - Clinics        Medical Decision Making:  Radha Bojorquez is a 34 year old female who presents with chest pain starting last night after a very stressful conversation which left her feeling \"breathless\" and with chest pain on the left upper chest wall radiating towards her throat/jaw. She took a 324mg ASA and this helped with the pain last night. Since then she has had a dull ache to the left side of her chest that worsens with exertion and deep breathing.  I considered a broad differential diagnosis in this patient including life-threatening etiologies such as acute coronary syndrome, myocardial infarction, pulmonary embolism, acute aortic dissection, myocarditis, pericarditis, acute valvular insufficiency amongst others.  Other causes considered for this patient included pneumonia, pneumothorax, chest wall source, pericarditis, pleurisy, esophageal spasm, etc.  She did have a major surgery last month; abdominal reconstructive surgery.  I am concerned for ACS versus PE in this patient.  EKG does show negative T waves in V1 through V3; no prior EKG to compare. Patient already took another 324mg ASA today.  She is hemodynamically stable and oxygen is 100% on room air here. She declines EMS.  Patient was directed to go to the ER for further evaluation now.  Questions answered.      Ajit Armas PA-C  [facility]    Subjective     Radha Bojorquez is a 34 year old female who presents to clinic today for the following health issues:  Chief Complaint   Patient presents with    Urgent Care     Since last night, SOB, cough and sore throat going into chest        HPI  Patient is that she was having a very stressful conversation last night on the phone with her sister, suddenly had some shortness of breath, chest pain in the left side that radiates towards her throat, and felt like she was choked " "up and could not move.  She states that her she had a tough time getting up as she was feeling somewhat debilitated, ultimately got to the shower and felt slightly better but continued to have chest pain.  She took 3 4 mg aspirin which helped a little bit.  States the pain might of gone away for a little bit at night.  She woke up and is continuing to have a dull chest pain on the left side which radiates towards her throat.  She states it seems to worse with exertion and deep breathing.  She also notes that she had a major abdominal reconstructive surgery a month ago.  She denies any unilateral leg swelling, but does have some bilateral ankle swelling normally.  No calf pain.  No history of DVT or PE.  She states that she has a history of \"cardiac problems\" and thinks she might of had murmur, was ultimately given a pacemaker which she had \"for a while.\"  She is feeling a little bit lightheaded, but not like she is going to faint.  She took another 324 mg aspirin prior to arrival here.     Review of Systems    See HPI    Objective      Vitals: /73 (BP Location: Left arm, Patient Position: Sitting, Cuff Size: Adult Regular)   Pulse 68   Temp 98.5  F (36.9  C) (Tympanic)   Resp 24   LMP 12/08/2024   SpO2 100%       Patient Vitals for the past 24 hrs:   BP Temp Temp src Pulse Resp SpO2   12/28/24 1652 109/73 98.5  F (36.9  C) Tympanic 68 24 100 %       Vital signs reviewed by: Ajit Armas PA-C    Physical Exam   Constitutional: Patient is alert and cooperative. Mild acute distress.  Cardiovascular: Regular rate and rhythm. No murmur, rubs or gallops.   Pulmonary/Chest: Lungs are clear to auscultation throughout. Effort normal. No respiratory distress. No wheezes, rales or rhonchi.  Neurological: Alert and oriented x3.   Skin: No rash noted on visualized skin.  Psychiatric:The patient has a normal mood and affect.     EKG - Reviewed and interpreted by: Ajit Armas PA-C  Rate: 77 bpm   ME: 150 ms  QTc: " 435 ms  Normal sinus rhythm. T-wave inversion in leads V1-V3. No STEMI. no LVH by voltage criteria.       Ajit Armas PA-C, December 28, 2024

## 2024-12-28 NOTE — PATIENT INSTRUCTIONS
Given your slightly abnormal EKG, chest pain, hx of recent major surgery you should go to the ER now for further evaluation to rule out cardiac causes and/or pulmonary embolism.

## 2024-12-29 LAB
ATRIAL RATE - MUSE: 81 BPM
DIASTOLIC BLOOD PRESSURE - MUSE: NORMAL MMHG
INTERPRETATION ECG - MUSE: NORMAL
P AXIS - MUSE: 41 DEGREES
PR INTERVAL - MUSE: 122 MS
QRS DURATION - MUSE: 88 MS
QT - MUSE: 396 MS
QTC - MUSE: 460 MS
R AXIS - MUSE: 84 DEGREES
SYSTOLIC BLOOD PRESSURE - MUSE: NORMAL MMHG
T AXIS - MUSE: 66 DEGREES
VENTRICULAR RATE- MUSE: 81 BPM

## 2024-12-29 NOTE — DISCHARGE INSTRUCTIONS
Purchase over-the-counter patches containing lidocaine.  Continue Libia if it is helpful.  You can also use Tylenol.  Return with worsening or change in symptoms or if you develop new concerns.  Otherwise, follow-up with primary care.

## 2024-12-29 NOTE — ED TRIAGE NOTES
Patient has a recent history of an abdominal wall reconstruction and also some anxiety. She comes in wanting to rule out a cardiac event and also is concerned about a blood clot. She has a history of panic attacks but it has been several years.      Triage Assessment (Adult)       Row Name 12/28/24 [id number]          Triage Assessment    Airway WDL WDL        Respiratory WDL    Respiratory WDL WDL        Skin Circulation/Temperature WDL    Skin Circulation/Temperature WDL WDL        Cardiac WDL    Cardiac WDL X;chest pain

## 2024-12-29 NOTE — ED PROVIDER NOTES
Emergency Department Note      History of Present Illness     Chief Complaint   Chest Pain      HPI   Radha Bojorquez is a 34 year old female who presents with her cousin for the evaluation of chest pain.  She is recovering well from an abdominal wall reconstruction 11/15/2024.  Yesterday she had a verbal argument and walked away to try to calm herself down.  She suddenly had sharp pain in her left upper chest that got worse when she tried to stand and shot up towards her throat.  She felt she could not even talk because her pain was so bad.  She did not feel improved by taking a hot shower but after aspirin her pain improved such that she was able to go to bed.  When she awoke today she had a throbbing constant chest pain with intermittent episodes of more sharp pain throughout the day.  This sharp pain lasts for 2 to 3 minutes at a time and seems to be worse with deep inspiration, talking, or moving around.  This prompted her visit to urgent care who referred her to the emergency department.  She did take aspirin again today which she felt was helpful.  She does not have shortness of breath other than when the pain takes her breath away.  She has no change in her symmetric ankle edema.  She has chronic nausea which is unchanged and no vomiting, cough, or fever.    Independent Historian   None    Review of External Notes   I reviewed a general surgery note from 12/19/2024 when she followed up for her abdominal wall reconstruction on 11/15/2024  I also reviewed an Urgent Care note from today, when she had chest pain     Past Medical History     Medical History and Problem List   Anemia  Pneumothorax  IUD in place  Depression  Hemoglobin C trait  Thrombocytopenia  Tobacco smoking  Hernia umbilical  Closed fracture rib L  Chronic headache  Diastasis recti  Kidney infection    Medications   levonorgestrel     Surgical History   Herniorrhaphy umbilical  Reconstruction abdominal wall    Physical Exam     Patient Vitals  for the past 24 hrs:   BP Temp Temp src Pulse Resp SpO2 Weight   12/28/24 1912 -- -- -- -- -- -- 68 kg (150 lb)   12/28/24 1848 -- -- -- -- 24 -- --   12/28/24 1841 (!) 154/93 98.5  F (36.9  C) Tympanic 99 -- 99 % --     Physical Exam  General: Well-developed and well-nourished. Well appearing young -American woman. Cooperative.  Head:  Atraumatic.  Eyes:  Conjunctivae, lids, and sclerae are normal.  ENT:    Normal nose. Moist mucous membranes.  Neck:  Supple. Normal range of motion.  CV:  Regular rate and rhythm. Normal heart sounds with no murmurs, rubs, or gallops detected.  Resp:  No respiratory distress. Clear to auscultation bilaterally without decreased breath sounds, wheezing, rales, or rhonchi.  GI:  Soft. Non-distended. Non-tender.  Well-healing surgical incisions.    MS:  Normal ROM. No objective bilateral lower extremity edema.  Mild tenderness over the left mid chest wall.  Skin:  Warm. Non-diaphoretic. No pallor.  Neuro:  Awake. A&Ox3. Normal strength.  Psych: Normal mood and affect. Normal speech.  Vitals reviewed.    Diagnostics     Lab Results   Labs Ordered and Resulted from Time of ED Arrival to Time of ED Departure   COMPREHENSIVE METABOLIC PANEL - Abnormal       Result Value    Sodium 139      Potassium 3.8      Carbon Dioxide (CO2) 24      Anion Gap 9      Urea Nitrogen 9.3      Creatinine 0.79      GFR Estimate >90      Calcium 8.4 (*)     Chloride 106      Glucose 82      Alkaline Phosphatase 75      AST 15      ALT 7      Protein Total 7.0      Albumin 3.9      Bilirubin Total 0.5     CBC WITH PLATELETS AND DIFFERENTIAL - Abnormal    WBC Count 5.3      RBC Count 3.78 (*)     Hemoglobin 10.6 (*)     Hematocrit 29.9 (*)     MCV 79      MCH 28.0      MCHC 35.5      RDW 14.6      Platelet Count 228      % Neutrophils 59      % Lymphocytes 33      % Monocytes 6      % Eosinophils 1      % Basophils 0      % Immature Granulocytes 0      NRBCs per 100 WBC 0      Absolute Neutrophils 3.1       Absolute Lymphocytes 1.8      Absolute Monocytes 0.3      Absolute Eosinophils 0.1      Absolute Basophils 0.0      Absolute Immature Granulocytes 0.0      Absolute NRBCs 0.0     D DIMER QUANTITATIVE - Abnormal    D-Dimer Quantitative 0.74 (*)    TROPONIN T, HIGH SENSITIVITY - Normal    Troponin T, High Sensitivity <6     HCG QUALITATIVE PREGNANCY - Normal    hCG Serum Qualitative Negative         Imaging   CT Chest Pulmonary Embolism w Contrast   Final Result   IMPRESSION:   1.  Negative for pulmonary embolism.   2.  No acute airspace opacities.             EKG  Indication: Chest pain   Time: 1839  Rate 81 bpm. ND interval 122. QRS duration 88. QT/QTc 396/460.   Normal sinus rhythm  Nonspecific T wave abnormality  Prolonged QT  Artifact in V2  Abnormal ECG    No acute ST changes.  No change from earlier today, 12/28/2024.    Independent Interpretation   CT PE reviewed and I see no central PE.    ED Course      Medications Administered   Medications   ondansetron (ZOFRAN) injection 4 mg (4 mg Intravenous $Given 12/28/24 1858)   ketorolac (TORADOL) injection 15 mg (15 mg Intravenous $Given 12/28/24 2130)   acetaminophen (TYLENOL) tablet 1,000 mg (1,000 mg Oral $Given 12/28/24 2130)     ED Course   ED Course as of 01/01/25 1430   Sat Dec 28, 2024   2058 I evaluated the patient and obtained history.    9144 I updated the patient on findings and plan for discharge.  She feels the lidocaine patches have been helpful.       Additional Documentation  Social Determinants of Health: Social Connections/Isolation: supportive cousin    Medical Decision Making / Diagnosis   MIPS   CT for PE was ordered because the patient had an abnormal d-dimer.    MYRNA Garcia is a 34 year old woman presenting with chest pain that started after a verbal argument yesterday and seemed to improve after aspirin.  It is still present and is worse with deep inspiration, talking, or moving around.  She is well-appearing on exam with some mild  tenderness over the left chest wall.  For this she was given lidocaine patch, Toradol, and Tylenol with further improvement.    EKG is reassuring without acute ST changes or arrhythmias and troponin is undetectable.  I doubt a cardiac etiology for this presentation as her pain is somewhat atypical and she is young and healthy.  D-dimer was elevated so she was sent for CT PE study which is without PE or other acute pathology.  The remainder of her laboratory studies are reassuring.    She is comfortable with plan for discharge with continued use of lidocaine patches, aspirin, and Tylenol.  I encouraged her to follow-up with primary care but she does understand to return if she has worsening or change or if new symptom.  All questions answered.    Disposition   The patient was discharged.     Diagnosis     ICD-10-CM    1. Left-sided chest pain  R07.9            Discharge Medications   Discharge Medication List as of 12/29/2024 12:10 AM            Scribe Disclosure:  Leidy HERNÁNDEZ, am serving as a scribe at 8:58 PM on 12/28/2024 to document services personally performed by Lisa Trinidad MD based on my observations and the provider's statements to me.        Lisa Trinidad MD  01/01/25 [id number]

## 2025-01-06 ENCOUNTER — TELEPHONE (OUTPATIENT)
Dept: SURGERY | Facility: CLINIC | Age: 35
End: 2025-01-06
Payer: COMMERCIAL

## 2025-01-20 ENCOUNTER — OFFICE VISIT (OUTPATIENT)
Dept: SURGERY | Facility: CLINIC | Age: 35
End: 2025-01-20
Payer: COMMERCIAL

## 2025-01-20 DIAGNOSIS — Z09 SURGERY FOLLOW-UP EXAMINATION: Primary | ICD-10-CM

## 2025-01-20 PROCEDURE — 99024 POSTOP FOLLOW-UP VISIT: CPT | Performed by: SURGERY

## 2025-01-21 NOTE — PROGRESS NOTES
Patient returns in basically long-term follow-up after complex abdominal wall reconstruction in mid November.  She continues to show good improvement overall.  She states that she is back to normal activity.  Sounds as though she was recently in the emergency department after likely an anxiety primarily associated with upper chest pain and some mild abdominal discomfort.  She has returned to physical activity.  Reports no significant ongoing abdominal wall discomfort.  No fevers or chills.    On examination: Her incision is healed.  There is no evidence of infection.  There is some persistent mild swelling in the supraumbilical area.  There is good approximation abdominal muscles without evidence of hernia.    Overall doing well.  Will send for physical therapy to work on core strength and flexibility.  Overall though at this point I believe she can follow-up on an as-needed basis.

## 2025-03-16 ENCOUNTER — HEALTH MAINTENANCE LETTER (OUTPATIENT)
Age: 35
End: 2025-03-16

## 2025-03-31 ENCOUNTER — APPOINTMENT (OUTPATIENT)
Dept: CT IMAGING | Facility: CLINIC | Age: 35
End: 2025-03-31
Payer: COMMERCIAL

## 2025-03-31 ENCOUNTER — HOSPITAL ENCOUNTER (EMERGENCY)
Facility: CLINIC | Age: 35
Discharge: HOME | End: 2025-03-31
Payer: COMMERCIAL

## 2025-03-31 VITALS
DIASTOLIC BLOOD PRESSURE: 83 MMHG | BODY MASS INDEX: 21.48 KG/M2 | WEIGHT: 145 LBS | HEIGHT: 69 IN | HEART RATE: 81 BPM | SYSTOLIC BLOOD PRESSURE: 128 MMHG | TEMPERATURE: 98.7 F | OXYGEN SATURATION: 100 %

## 2025-03-31 DIAGNOSIS — G89.29 CHRONIC HEADACHES: ICD-10-CM

## 2025-03-31 DIAGNOSIS — G44.209 ACUTE NON INTRACTABLE TENSION-TYPE HEADACHE: ICD-10-CM

## 2025-03-31 DIAGNOSIS — R10.9 ABDOMINAL PAIN: ICD-10-CM

## 2025-03-31 DIAGNOSIS — N83.201 RIGHT OVARIAN CYST: ICD-10-CM

## 2025-03-31 DIAGNOSIS — R51.9 CHRONIC HEADACHES: ICD-10-CM

## 2025-03-31 LAB
ALBUMIN SERPL BCG-MCNC: 4.1 G/DL (ref 3.5–5.2)
ALBUMIN UR-MCNC: 10 MG/DL
ALP SERPL-CCNC: 81 U/L (ref 40–150)
ALT SERPL W P-5'-P-CCNC: 6 U/L (ref 0–50)
ANION GAP SERPL CALCULATED.3IONS-SCNC: 8 MMOL/L (ref 7–15)
APPEARANCE UR: CLEAR
AST SERPL W P-5'-P-CCNC: 14 U/L (ref 0–45)
BACTERIA #/AREA URNS HPF: ABNORMAL /HPF
BASOPHILS # BLD AUTO: 0 10E3/UL (ref 0–0.2)
BASOPHILS NFR BLD AUTO: 1 %
BILIRUB SERPL-MCNC: 0.3 MG/DL
BILIRUB UR QL STRIP: NEGATIVE
BUN SERPL-MCNC: 10.6 MG/DL (ref 6–20)
CALCIUM SERPL-MCNC: 8.9 MG/DL (ref 8.8–10.4)
CHLORIDE SERPL-SCNC: 107 MMOL/L (ref 98–107)
COLOR UR AUTO: YELLOW
CREAT SERPL-MCNC: 0.87 MG/DL (ref 0.51–0.95)
EGFRCR SERPLBLD CKD-EPI 2021: 88 ML/MIN/1.73M2
EOSINOPHIL # BLD AUTO: 0.1 10E3/UL (ref 0–0.7)
EOSINOPHIL NFR BLD AUTO: 2 %
ERYTHROCYTE [DISTWIDTH] IN BLOOD BY AUTOMATED COUNT: 15.2 % (ref 10–15)
FLUAV RNA SPEC QL NAA+PROBE: NEGATIVE
FLUBV RNA RESP QL NAA+PROBE: NEGATIVE
GLUCOSE SERPL-MCNC: 67 MG/DL (ref 70–99)
GLUCOSE UR STRIP-MCNC: NEGATIVE MG/DL
HCG SERPL QL: NEGATIVE
HCO3 SERPL-SCNC: 25 MMOL/L (ref 22–29)
HCT VFR BLD AUTO: 31.5 % (ref 35–47)
HGB BLD-MCNC: 11.2 G/DL (ref 11.7–15.7)
HGB UR QL STRIP: NEGATIVE
IMM GRANULOCYTES # BLD: 0 10E3/UL
IMM GRANULOCYTES NFR BLD: 0 %
KETONES UR STRIP-MCNC: ABNORMAL MG/DL
LEUKOCYTE ESTERASE UR QL STRIP: ABNORMAL
LIPASE SERPL-CCNC: 23 U/L (ref 13–60)
LYMPHOCYTES # BLD AUTO: 2 10E3/UL (ref 0.8–5.3)
LYMPHOCYTES NFR BLD AUTO: 39 %
MCH RBC QN AUTO: 27.7 PG (ref 26.5–33)
MCHC RBC AUTO-ENTMCNC: 35.6 G/DL (ref 31.5–36.5)
MCV RBC AUTO: 78 FL (ref 78–100)
MONOCYTES # BLD AUTO: 0.6 10E3/UL (ref 0–1.3)
MONOCYTES NFR BLD AUTO: 12 %
MUCOUS THREADS #/AREA URNS LPF: PRESENT /LPF
NEUTROPHILS # BLD AUTO: 2.5 10E3/UL (ref 1.6–8.3)
NEUTROPHILS NFR BLD AUTO: 47 %
NITRATE UR QL: NEGATIVE
NRBC # BLD AUTO: 0 10E3/UL
NRBC BLD AUTO-RTO: 0 /100
PH UR STRIP: 6.5 [PH] (ref 5–7)
PLATELET # BLD AUTO: 179 10E3/UL (ref 150–450)
POTASSIUM SERPL-SCNC: 3.8 MMOL/L (ref 3.4–5.3)
PROT SERPL-MCNC: 7.4 G/DL (ref 6.4–8.3)
RBC # BLD AUTO: 4.05 10E6/UL (ref 3.8–5.2)
RBC URINE: <1 /HPF
RSV RNA SPEC NAA+PROBE: NEGATIVE
SARS-COV-2 RNA RESP QL NAA+PROBE: NEGATIVE
SODIUM SERPL-SCNC: 140 MMOL/L (ref 135–145)
SP GR UR STRIP: 1.03 (ref 1–1.03)
SQUAMOUS EPITHELIAL: 4 /HPF
UROBILINOGEN UR STRIP-MCNC: 2 MG/DL
WBC # BLD AUTO: 5.2 10E3/UL (ref 4–11)
WBC URINE: 2 /HPF

## 2025-03-31 PROCEDURE — 96361 HYDRATE IV INFUSION ADD-ON: CPT

## 2025-03-31 PROCEDURE — 84132 ASSAY OF SERUM POTASSIUM: CPT

## 2025-03-31 PROCEDURE — 36415 COLL VENOUS BLD VENIPUNCTURE: CPT

## 2025-03-31 PROCEDURE — 83690 ASSAY OF LIPASE: CPT

## 2025-03-31 PROCEDURE — 99285 EMERGENCY DEPT VISIT HI MDM: CPT | Mod: 25

## 2025-03-31 PROCEDURE — 96374 THER/PROPH/DIAG INJ IV PUSH: CPT | Mod: 59

## 2025-03-31 PROCEDURE — 87637 SARSCOV2&INF A&B&RSV AMP PRB: CPT

## 2025-03-31 PROCEDURE — 85018 HEMOGLOBIN: CPT

## 2025-03-31 PROCEDURE — 84703 CHORIONIC GONADOTROPIN ASSAY: CPT

## 2025-03-31 PROCEDURE — 74177 CT ABD & PELVIS W/CONTRAST: CPT

## 2025-03-31 PROCEDURE — 81003 URINALYSIS AUTO W/O SCOPE: CPT

## 2025-03-31 PROCEDURE — 258N000003 HC RX IP 258 OP 636

## 2025-03-31 PROCEDURE — 250N000009 HC RX 250

## 2025-03-31 PROCEDURE — 85004 AUTOMATED DIFF WBC COUNT: CPT

## 2025-03-31 PROCEDURE — 250N000011 HC RX IP 250 OP 636

## 2025-03-31 RX ORDER — KETOROLAC TROMETHAMINE 15 MG/ML
15 INJECTION, SOLUTION INTRAMUSCULAR; INTRAVENOUS ONCE
Status: COMPLETED | OUTPATIENT
Start: 2025-03-31 | End: 2025-03-31

## 2025-03-31 RX ORDER — PREDNISONE 20 MG/1
TABLET ORAL
Qty: 10 TABLET | Refills: 0 | Status: SHIPPED | OUTPATIENT
Start: 2025-03-31

## 2025-03-31 RX ORDER — IOPAMIDOL 755 MG/ML
73 INJECTION, SOLUTION INTRAVASCULAR ONCE
Status: COMPLETED | OUTPATIENT
Start: 2025-03-31 | End: 2025-03-31

## 2025-03-31 RX ADMIN — SODIUM CHLORIDE 61 ML: 9 INJECTION, SOLUTION INTRAVENOUS at 22:25

## 2025-03-31 RX ADMIN — IOPAMIDOL 73 ML: 755 INJECTION, SOLUTION INTRAVENOUS at 22:25

## 2025-03-31 RX ADMIN — SODIUM CHLORIDE 1000 ML: 9 INJECTION, SOLUTION INTRAVENOUS at 21:52

## 2025-03-31 RX ADMIN — KETOROLAC TROMETHAMINE 15 MG: 15 INJECTION, SOLUTION INTRAMUSCULAR; INTRAVENOUS at 21:51

## 2025-03-31 ASSESSMENT — COLUMBIA-SUICIDE SEVERITY RATING SCALE - C-SSRS
1. IN THE PAST MONTH, HAVE YOU WISHED YOU WERE DEAD OR WISHED YOU COULD GO TO SLEEP AND NOT WAKE UP?: NO
6. HAVE YOU EVER DONE ANYTHING, STARTED TO DO ANYTHING, OR PREPARED TO DO ANYTHING TO END YOUR LIFE?: NO
2. HAVE YOU ACTUALLY HAD ANY THOUGHTS OF KILLING YOURSELF IN THE PAST MONTH?: NO

## 2025-03-31 ASSESSMENT — ACTIVITIES OF DAILY LIVING (ADL)
ADLS_ACUITY_SCORE: 44

## 2025-04-01 NOTE — DISCHARGE INSTRUCTIONS
Discharge Instructions  Headache    You were seen today for a headache. Headaches may be caused by many different things such as muscle tension, sinus inflammation, anxiety and stress, having too little sleep, too much alcohol, some medical conditions or injury. You may have a migraine, which is caused by changes in the blood vessels in your head.  At this time your provider does not find that your headache is a sign of anything dangerous or life-threatening.  However, sometimes the signs of serious illness do not show up right away.      Generally, every Emergency Department visit should have a follow-up clinic visit with either a primary or a specialty clinic/provider. Please follow-up as instructed by your emergency provider today.    Return to the Emergency Department if:  You get a new fever of 100.4 F or higher.  Your headache gets much worse.  You get a stiff neck with your headache.  You get a new headache that is significantly different or worse than headaches you have had before.  You are vomiting (throwing up) and cannot keep food or water down.  You have blurry or double vision or other problems with your eyes.  You have a new weakness on one side of your body.  You have difficulty with balance which is new.  You or your family thinks you are confused.  You have a seizure.    What can I do to help myself?  Pain medications - You may take a pain medication such as Tylenol  (acetaminophen), Advil , Motrin  (ibuprofen) or Aleve  (naproxen).  Take a pain reliever as soon as you notice symptoms.  Starting medications as soon as you start to have symptoms may lessen the amount of pain you have.  Relaxing in a quiet, dark room may help.  Get enough sleep and eat meals regularly.  You may need to watch for certain foods or other things which may trigger your headaches.  Keeping a journal of your headaches and possible triggers may help you and your primary provider to identify things which you should avoid which  may be causing your headaches.  If you were given a prescription for medicine here today, be sure to read all of the information (including the package insert) that comes with your prescription.  This will include important information about the medicine, its side effects, and any warnings that you need to know about.  The pharmacist who fills the prescription can provide more information and answer questions you may have about the medicine.  If you have questions or concerns that the pharmacist cannot address, please call or return to the Emergency Department.   Remember that you can always come back to the Emergency Department if you are not able to see your regular provider in the amount of time listed above, if you get any new symptoms, or if there is anything that worries you.     Discharge Instructions  Abdominal Pain    Abdominal pain (belly pain) can be caused by many things. Your evaluation today does not show the exact cause for your pain. Your provider today has decided that it is unlikely your pain is due to a life threatening problem, or a problem requiring surgery or hospital admission. Sometimes those problems cannot be found right away, so it is very important that you follow up as directed.  Sometimes only the changes which occur over time allow the cause of your pain to be found.    Generally, every Emergency Department visit should have a follow-up clinic visit with either a primary or a specialty clinic/provider. Please follow-up as instructed by your emergency provider today. With abdominal pain, we often recommend very close follow-up, such as the following day.    ADULTS:  Return to the Emergency Department right away if:    You get an oral temperature above 102oF or as directed by your provider.  You have blood in your stools. This may be bright red or appear as black, tarry stools.    You keep vomiting (throwing up) or cannot drink liquids.  You see blood when you vomit.   You cannot have a  bowel movement or you cannot pass gas.  Your stomach gets bloated or bigger.  Your skin or the whites of your eyes look yellow.  You faint.  You have bloody, frequent or painful urination (peeing).  You have new symptoms or anything that worries you.    CHILDREN:  Return to the Emergency Department right away if your child has any of the above-listed symptoms or the following:    Pushes your hand away or screams/cries when his/her belly is touched.  You notice your child is very fussy or weak.  Your child is very tired and is too tired to eat or drink.  Your child is dehydrated.  Signs of dehydration can be:  Significant change in the amount of wet diapers/urine.  Your infant or child starts to have dry mouth and lips, or no saliva (spit) or tears.    PREGNANT WOMEN:  Return to the Emergency Department right away if you have any of the above-listed symptoms or the following:    You have bleeding, leaking fluid or passing tissue from the vagina.  You have worse pain or cramping, or pain in your shoulder or back.  You have vomiting that will not stop.  You have a temperature of 100oF or more.  Your baby is not moving as much as usual.  You faint.  You get a bad headache with or without eye problems and abdominal pain.  You have a seizure.  You have unusual discharge from your vagina and abdominal pain.    Abdominal pain is pretty common during pregnancy.  Your pain may or may not be related to your pregnancy. You should follow-up closely with your OB provider so they can evaluate you and your baby.  Until you follow-up with your regular provider, do the following:     Avoid sex and do not put anything in your vagina.  Drink clear fluids.  Only take medications approved by your provider.    MORE INFORMATION:    Appendicitis:  A possible cause of abdominal pain in any person who still has their appendix is acute appendicitis. Appendicitis is often hard to diagnose.  Testing does not always rule out early appendicitis or  "other causes of abdominal pain. Close follow-up with your provider and re-evaluations may be needed to figure out the reason for your abdominal pain.    Follow-up:  It is very important that you make an appointment with your clinic and go to the appointment.  If you do not follow-up with your primary provider, it may result in missing an important development which could result in permanent injury or disability and/or lasting pain.  If there is any problem keeping your appointment, call your provider or return to the Emergency Department.    Medications:  Take your medications as directed by your provider today.  Before using over-the-counter medications, ask your provider and make sure to take the medications as directed.  If you have any questions about medications, ask your provider.    Diet:  Resume your normal diet as much as possible, but do not eat fried, fatty or spicy foods while you have pain.  Do not drink alcohol or have caffeine.  Do not smoke tobacco.    Probiotics: If you have been given an antibiotic, you may want to also take a probiotic pill or eat yogurt with live cultures. Probiotics have \"good bacteria\" to help your intestines stay healthy. Studies have shown that probiotics help prevent diarrhea (loose stools) and other intestine problems (including C. diff infection) when you take antibiotics. You can buy these without a prescription in the pharmacy section of the store.     If you were given a prescription for medicine here today, be sure to read all of the information (including the package insert) that comes with your prescription.  This will include important information about the medicine, its side effects, and any warnings that you need to know about.  The pharmacist who fills the prescription can provide more information and answer questions you may have about the medicine.  If you have questions or concerns that the pharmacist cannot address, please call or return to the Emergency " Department.       Remember that you can always come back to the Emergency Department if you are not able to see your regular provider in the amount of time listed above, if you get any new symptoms, or if there is anything that worries you.

## 2025-04-01 NOTE — ED TRIAGE NOTES
Frontal headaches for 2 weeks. No meds taken PTA     Triage Assessment (Adult)       Row Name 03/31/25 1941          Triage Assessment    Airway WDL WDL        Respiratory WDL    Respiratory WDL WDL        Skin Circulation/Temperature WDL    Skin Circulation/Temperature WDL WDL        Cardiac WDL    Cardiac WDL WDL        Peripheral/Neurovascular WDL    Peripheral Neurovascular WDL WDL        Cognitive/Neuro/Behavioral WDL    Cognitive/Neuro/Behavioral WDL WDL

## 2025-04-01 NOTE — ED PROVIDER NOTES
Emergency Department Note      History of Present Illness     Chief Complaint   Headache      HPI   Radha Bojorquez is a 34 year old female with history of chronic headaches, diastases recti, and abdominal wall reconstructive surgery on 11/15/2024 who presents for evaluation with multiple complaints.  Patient states for the last 3 weeks she has had headache, excessive mucus production, and abdominal discomfort.  Patient states headache has been constant, not the worst headache of her life, been slowly getting worse, not a thunderclap headache.  Patient has not taken any medications for this headache over the past 3 weeks including Tylenol and ibuprofen.  Patient also notes she feels as though she has excessive mucus production in her throat and nose.  Patient also complains of abdominal discomfort across her lower abdomen.  She notes she was told surgery to stop wearing her abdominal binder in late January, abdominal discomfort started soon after.  She describes discomfort as a burning sensation, similar to pain she has had in the past prior to her abdominal reconstructive surgery.  Pain does not radiate.  Patient has also had burning sensation with urination.  Patient notes she also had very light vaginal spotting after her Mirena IUD placement, no vaginal bleeding or discharge otherwise.  Patient denies diarrhea or constipation, bright red blood in her stool, melena, neck/back pain, flank pain, fever or chills, chest pain, and shortness of breath.    Independent Historian   None    Review of External Notes   11/15/2024, admission note reviewed for diastases recti, abdominal wall reconstruction.    Past Medical History     Medical History and Problem List   Past Medical History:   Diagnosis Date    Anemia     IUD (intrauterine device) in place 12/18/2024    Pneumothorax        Medications   acetaminophen (TYLENOL) 500 MG tablet  ferrous sulfate (FEROSUL) 325 (65 Fe) MG tablet  levonorgestrel (MIRENA) 52 MG (20  "mcg/day) IUD  methocarbamol (ROBAXIN) 500 MG tablet  oxyCODONE (ROXICODONE) 5 MG tablet  predniSONE (DELTASONE) 20 MG tablet  senna-docusate (SENOKOT-S/PERICOLACE) 8.6-50 MG tablet        Surgical History   Past Surgical History:   Procedure Laterality Date    HERNIA REPAIR  2019    RECONSTRUCT ABDOMINAL WALL N/A 11/15/2024    Procedure: RECONSTRUCTION, ABDOMINAL WALL;  Surgeon: Abilio Barone MD;  Location:  OR       Physical Exam     Patient Vitals for the past 24 hrs:   BP Temp Temp src Pulse SpO2 Height Weight   03/31/25 1943 128/83 98.7  F (37.1  C) Oral 81 100 % 1.753 m (5' 9\") 65.8 kg (145 lb)     Physical Exam  Physical Exam:  GENERAL: Warm, dry, alert, no increased work of breathing sitting on edge of bed fully clothed without acute distress, nontoxic-appearing, speaking full clear sentences without difficulty  HEENT: PERRL, no scleral icterus, clear conjunctiva, posterior oropharynx clear without erythema, uvula midline.  NECK: No JVD, supple without lymphadenopathy.  No stiffness or restricted range of motion  HEART: Regular rate and rhythm, no murmur or rubs  LUNGS: CTAB, moving air well.  No crackles or wheezes are heard.  ABD: Suprapubic tenderness without rebound, soft, nondistended, no guarding, with good bowel sounds heard.  BACK: No CVAT, no obvious deformities  EXTREMITIES: Moves all extremities without difficulty, no calf tenderness or peripheral edema.  SKIN: Warm and dry without rash or lesions.  NEUROLOGICAL: No focal deficits.  CN II-XII intact.  No meningismus.  PSYCH: Appropriate mood and affect.     Diagnostics     Lab Results   Labs Ordered and Resulted from Time of ED Arrival to Time of ED Departure   COMPREHENSIVE METABOLIC PANEL - Abnormal       Result Value    Sodium 140      Potassium 3.8      Carbon Dioxide (CO2) 25      Anion Gap 8      Urea Nitrogen 10.6      Creatinine 0.87      GFR Estimate 88      Calcium 8.9      Chloride 107      Glucose 67 (*)     Alkaline " Phosphatase 81      AST 14      ALT 6      Protein Total 7.4      Albumin 4.1      Bilirubin Total 0.3     ROUTINE UA WITH MICROSCOPIC REFLEX TO CULTURE - Abnormal    Color Urine Yellow      Appearance Urine Clear      Glucose Urine Negative      Bilirubin Urine Negative      Ketones Urine Trace (*)     Specific Gravity Urine 1.030      Blood Urine Negative      pH Urine 6.5      Protein Albumin Urine 10 (*)     Urobilinogen Urine 2.0 (*)     Nitrite Urine Negative      Leukocyte Esterase Urine Trace (*)     Bacteria Urine Few (*)     Mucus Urine Present (*)     RBC Urine <1      WBC Urine 2      Squamous Epithelials Urine 4 (*)    CBC WITH PLATELETS AND DIFFERENTIAL - Abnormal    WBC Count 5.2      RBC Count 4.05      Hemoglobin 11.2 (*)     Hematocrit 31.5 (*)     MCV 78      MCH 27.7      MCHC 35.6      RDW 15.2 (*)     Platelet Count 179      % Neutrophils 47      % Lymphocytes 39      % Monocytes 12      % Eosinophils 2      % Basophils 1      % Immature Granulocytes 0      NRBCs per 100 WBC 0      Absolute Neutrophils 2.5      Absolute Lymphocytes 2.0      Absolute Monocytes 0.6      Absolute Eosinophils 0.1      Absolute Basophils 0.0      Absolute Immature Granulocytes 0.0      Absolute NRBCs 0.0     LIPASE - Normal    Lipase 23     HCG QUALITATIVE PREGNANCY - Normal    hCG Serum Qualitative Negative     INFLUENZA A/B, RSV AND SARS-COV2 PCR - Normal    Influenza A PCR Negative      Influenza B PCR Negative      RSV PCR Negative      SARS CoV2 PCR Negative         Imaging   CT Abdomen Pelvis w Contrast   Final Result   IMPRESSION:    1.  Collapsing right ovarian cyst measures 3.0 x 1.2 x 1.4 cm with a small amount of associated free fluid in the pelvis. No follow up needed.              Independent Interpretation   None    ED Course      Medications Administered   Medications   sodium chloride 0.9% BOLUS 1,000 mL (0 mLs Intravenous Stopped 3/31/25 4240)   ketorolac (TORADOL) injection 15 mg (15 mg Intravenous  $Given 3/31/25 2151)   iopamidol (ISOVUE-370) solution 73 mL (73 mLs Intravenous $Given 3/31/25 2225)   sodium chloride 0.9 % bag for CT scan flush (61 mLs Intravenous $Given 3/31/25 2225)       Procedures   Procedures     Discussion of Management   None    ED Course   ED Course as of 03/31/25 2303   Mon Mar 31, 2025   2115 I evaluated and examined the patient   2250 I reevaluated the patient, headache and abdominal discomfort improved after receiving Toradol and IVF.   2259 Discussed results with patient.  Answered all questions.  Patient reports feeling much improved.       Additional Documentation  None    Medical Decision Making / Diagnosis     CMS Diagnoses: None    MIPS       None    MDM   Radha Bojorquez is a 34 year old female with history of chronic headaches, diastases recti, and abdominal wall reconstructive surgery on 11/15/2024 who presents for evaluation of headache and abdominal discomfort.  Differential includes but is not limited to migraine headache, tension type headache, SBO, ectopic pregnancy, acute cystitis, pyelonephritis, and viral syndrome among many others.  Vitals reassuring without fever, tachycardia, hypoxia.  On physical exam, patient had mild tenderness to palpation across her lower abdomen without rebound or guarding, no CVA tenderness, no meningismus.    Patient had very mild anemia, at baseline.  There is no leukocytosis.  There were no significant metabolic derangements.  Lipase and LFTs were within normal limits, I doubt pancreatitis, cholangitis, or other biliary pathology as cause of her abdominal pain.  The patient was not pregnant.  Viral swabs were negative for influenza, RSV, COVID-19.  Her urine was not infected.    Although I considered advanced head imaging, patient had no focal neurologic deficits, she did not describe a thunderclap headache, there were no concerning symptoms or physical exam findings.  I did obtain a CT of her abdomen/pelvis given her history of  multiple abdominal surgeries.  CT was significant for a collapsing right ovarian cyst with a small amount of free fluid in the pelvis with no follow-up recommended.  Free fluid was likely cause of patient's abdominal discomfort.  I discussed findings with patient.  I recommended she continue to take Tylenol alternating with ibuprofen every 6 hours.  Will also prescribe steroids to prevent refractory headache.  Also placed referral to establish primary care.  Thoroughly discussed return precautions including worsening headache, fever or chills among others.  Patient states he understands and agrees.  Patient was discharged.          Disposition   The patient was discharged.     Diagnosis     ICD-10-CM    1. Acute non intractable tension-type headache  G44.209 Primary Care Referral      2. Abdominal pain  R10.9 Primary Care Referral      3. Chronic headaches  R51.9 Primary Care Referral    G89.29       4. Right ovarian cyst  N83.201            Discharge Medications   New Prescriptions    PREDNISONE (DELTASONE) 20 MG TABLET    Take two tablets (= 40mg) each day for 5 (five) days         CHARLENE Ortega John, PA-C  03/31/25 3484

## 2025-04-01 NOTE — ED NOTES
Pt to D/C to home.  Pt provided with d/c instructions, including new medications, when medications were last given, and when to take them again.  Pt also informed to f/u with PCP with a referral.  Pt verbalized understanding of all d/c and f/u instructions.  All questions were answered at this time.  Copy of paperwork sent with pt.

## 2025-08-12 ENCOUNTER — APPOINTMENT (OUTPATIENT)
Dept: GENERAL RADIOLOGY | Facility: CLINIC | Age: 35
End: 2025-08-12
Attending: EMERGENCY MEDICINE
Payer: COMMERCIAL

## 2025-08-12 ENCOUNTER — HOSPITAL ENCOUNTER (EMERGENCY)
Facility: CLINIC | Age: 35
Discharge: HOME | End: 2025-08-12
Attending: EMERGENCY MEDICINE
Payer: COMMERCIAL

## 2025-08-12 VITALS
TEMPERATURE: 98.6 F | OXYGEN SATURATION: 98 % | RESPIRATION RATE: 22 BRPM | HEIGHT: 69 IN | BODY MASS INDEX: 21.26 KG/M2 | WEIGHT: 143.52 LBS | HEART RATE: 68 BPM | SYSTOLIC BLOOD PRESSURE: 117 MMHG | DIASTOLIC BLOOD PRESSURE: 79 MMHG

## 2025-08-12 DIAGNOSIS — R11.10 VOMITING, UNSPECIFIED VOMITING TYPE, UNSPECIFIED WHETHER NAUSEA PRESENT: ICD-10-CM

## 2025-08-12 DIAGNOSIS — R05.1 ACUTE COUGH: Primary | ICD-10-CM

## 2025-08-12 LAB
ANION GAP SERPL CALCULATED.3IONS-SCNC: 11 MMOL/L (ref 7–15)
BASOPHILS # BLD AUTO: 0.02 10E3/UL (ref 0–0.2)
BASOPHILS NFR BLD AUTO: 0.4 %
BUN SERPL-MCNC: 8 MG/DL (ref 6–20)
CALCIUM SERPL-MCNC: 8.8 MG/DL (ref 8.8–10.4)
CHLORIDE SERPL-SCNC: 108 MMOL/L (ref 98–107)
CREAT SERPL-MCNC: 0.83 MG/DL (ref 0.51–0.95)
EGFRCR SERPLBLD CKD-EPI 2021: >90 ML/MIN/1.73M2
EOSINOPHIL # BLD AUTO: 0.09 10E3/UL (ref 0–0.7)
EOSINOPHIL NFR BLD AUTO: 2 %
ERYTHROCYTE [DISTWIDTH] IN BLOOD BY AUTOMATED COUNT: 14.6 % (ref 10–15)
GLUCOSE SERPL-MCNC: 89 MG/DL (ref 70–99)
HCO3 SERPL-SCNC: 22 MMOL/L (ref 22–29)
HCT VFR BLD AUTO: 32.2 % (ref 35–47)
HGB BLD-MCNC: 11.3 G/DL (ref 11.7–15.7)
HOLD SPECIMEN: NORMAL
HOLD SPECIMEN: NORMAL
IMM GRANULOCYTES # BLD: 0.01 10E3/UL
IMM GRANULOCYTES NFR BLD: 0.2 %
LYMPHOCYTES # BLD AUTO: 1.55 10E3/UL (ref 0.8–5.3)
LYMPHOCYTES NFR BLD AUTO: 34.2 %
MCH RBC QN AUTO: 28.1 PG (ref 26.5–33)
MCHC RBC AUTO-ENTMCNC: 35.1 G/DL (ref 31.5–36.5)
MCV RBC AUTO: 80.1 FL (ref 78–100)
MONOCYTES # BLD AUTO: 0.5 10E3/UL (ref 0–1.3)
MONOCYTES NFR BLD AUTO: 11 %
NEUTROPHILS # BLD AUTO: 2.36 10E3/UL (ref 1.6–8.3)
NEUTROPHILS NFR BLD AUTO: 52.2 %
NRBC # BLD AUTO: 0 10E3/UL
NRBC BLD AUTO-RTO: 0 /100
PLATELET # BLD AUTO: 169 10E3/UL (ref 150–450)
POTASSIUM SERPL-SCNC: 4.5 MMOL/L (ref 3.4–5.3)
RBC # BLD AUTO: 4.02 10E6/UL (ref 3.8–5.2)
SODIUM SERPL-SCNC: 141 MMOL/L (ref 135–145)
WBC # BLD AUTO: 4.53 10E3/UL (ref 4–11)

## 2025-08-12 PROCEDURE — 36415 COLL VENOUS BLD VENIPUNCTURE: CPT | Performed by: EMERGENCY MEDICINE

## 2025-08-12 PROCEDURE — 96361 HYDRATE IV INFUSION ADD-ON: CPT

## 2025-08-12 PROCEDURE — 71045 X-RAY EXAM CHEST 1 VIEW: CPT

## 2025-08-12 PROCEDURE — 250N000011 HC RX IP 250 OP 636: Performed by: EMERGENCY MEDICINE

## 2025-08-12 PROCEDURE — 96374 THER/PROPH/DIAG INJ IV PUSH: CPT

## 2025-08-12 PROCEDURE — 258N000003 HC RX IP 258 OP 636: Performed by: EMERGENCY MEDICINE

## 2025-08-12 PROCEDURE — 85004 AUTOMATED DIFF WBC COUNT: CPT | Performed by: EMERGENCY MEDICINE

## 2025-08-12 PROCEDURE — 80048 BASIC METABOLIC PNL TOTAL CA: CPT | Performed by: EMERGENCY MEDICINE

## 2025-08-12 PROCEDURE — 99284 EMERGENCY DEPT VISIT MOD MDM: CPT | Mod: 25 | Performed by: EMERGENCY MEDICINE

## 2025-08-12 PROCEDURE — 96375 TX/PRO/DX INJ NEW DRUG ADDON: CPT

## 2025-08-12 RX ORDER — MORPHINE SULFATE 4 MG/ML
4 INJECTION, SOLUTION INTRAMUSCULAR; INTRAVENOUS ONCE
Status: COMPLETED | OUTPATIENT
Start: 2025-08-12 | End: 2025-08-12

## 2025-08-12 RX ORDER — ONDANSETRON 4 MG/1
4 TABLET, ORALLY DISINTEGRATING ORAL EVERY 8 HOURS PRN
Qty: 10 TABLET | Refills: 0 | Status: SHIPPED | OUTPATIENT
Start: 2025-08-12

## 2025-08-12 RX ORDER — ONDANSETRON 2 MG/ML
4 INJECTION INTRAMUSCULAR; INTRAVENOUS ONCE
Status: COMPLETED | OUTPATIENT
Start: 2025-08-12 | End: 2025-08-12

## 2025-08-12 RX ADMIN — ONDANSETRON 4 MG: 2 INJECTION, SOLUTION INTRAMUSCULAR; INTRAVENOUS at 12:27

## 2025-08-12 RX ADMIN — SODIUM CHLORIDE 1000 ML: 0.9 INJECTION, SOLUTION INTRAVENOUS at 12:27

## 2025-08-12 RX ADMIN — MORPHINE SULFATE 4 MG: 4 INJECTION, SOLUTION INTRAMUSCULAR; INTRAVENOUS at 12:30

## 2025-08-12 ASSESSMENT — ACTIVITIES OF DAILY LIVING (ADL)
ADLS_ACUITY_SCORE: 44
ADLS_ACUITY_SCORE: 44

## 2025-08-12 ASSESSMENT — COLUMBIA-SUICIDE SEVERITY RATING SCALE - C-SSRS
6. HAVE YOU EVER DONE ANYTHING, STARTED TO DO ANYTHING, OR PREPARED TO DO ANYTHING TO END YOUR LIFE?: NO
1. IN THE PAST MONTH, HAVE YOU WISHED YOU WERE DEAD OR WISHED YOU COULD GO TO SLEEP AND NOT WAKE UP?: NO
2. HAVE YOU ACTUALLY HAD ANY THOUGHTS OF KILLING YOURSELF IN THE PAST MONTH?: NO

## (undated) DEVICE — Device

## (undated) DEVICE — SUCTION TIP YANKAUER W/O VENT K86

## (undated) DEVICE — MANIFOLD NEPTUNE 4 PORT 700-20

## (undated) DEVICE — GLOVE BIOGEL PI SZ 8.0 40880

## (undated) DEVICE — BLADE KNIFE SURG 10 371110

## (undated) DEVICE — ESU PENCIL W/SMOKE EVAC NEPTUNE STRYKER 0703-046-000

## (undated) DEVICE — SU SILK 2-0 FSL 18" 677G

## (undated) DEVICE — DRAIN JACKSON PRATT RESERVOIR 100ML SU130-1305

## (undated) DEVICE — SU VICRYL 0 UR-6 27" J603H

## (undated) DEVICE — SPONGE LAP 18X18" X8435

## (undated) DEVICE — SU VICRYL 2-0 SH 27" J317H

## (undated) DEVICE — GOWN IMPERVIOUS SPECIALTY XLG/XLONG 32474

## (undated) DEVICE — SU MONOCRYL 4-0 PS-2 18" UND Y496G

## (undated) DEVICE — LINEN TOWEL PACK X5 5464

## (undated) DEVICE — DRAIN JACKSON PRATT 15FR ROUND SU130-1323

## (undated) DEVICE — DRSG STERI STRIP 1/2X4" R1547

## (undated) DEVICE — SU VICRYL 3-0 SH 27" J316H

## (undated) DEVICE — NEEDLE HYPO MONOJECT STANDARD 22GA 1 1/2IN BLUE 1188822112

## (undated) DEVICE — SOL WATER IRRIG 1000ML BOTTLE 2F7114

## (undated) DEVICE — ESU GROUND PAD UNIVERSAL W/O CORD

## (undated) DEVICE — BNDG ABDOMINAL BINDER 9X30-45" 79-89070

## (undated) DEVICE — PREP CHLORAPREP 26ML TINTED HI-LITE ORANGE 930815

## (undated) DEVICE — DRAPE BREAST/CHEST 29420

## (undated) DEVICE — PACK MINOR SBA15MIFSE

## (undated) DEVICE — DRSG GAUZE 4X4" 3033

## (undated) RX ORDER — DEXAMETHASONE SODIUM PHOSPHATE 4 MG/ML
INJECTION, SOLUTION INTRA-ARTICULAR; INTRALESIONAL; INTRAMUSCULAR; INTRAVENOUS; SOFT TISSUE
Status: DISPENSED
Start: 2024-11-15

## (undated) RX ORDER — FENTANYL CITRATE 0.05 MG/ML
INJECTION, SOLUTION INTRAMUSCULAR; INTRAVENOUS
Status: DISPENSED
Start: 2024-11-15

## (undated) RX ORDER — EPHEDRINE SULFATE 50 MG/ML
INJECTION, SOLUTION INTRAMUSCULAR; INTRAVENOUS; SUBCUTANEOUS
Status: DISPENSED
Start: 2024-11-15

## (undated) RX ORDER — HYDROMORPHONE HYDROCHLORIDE 1 MG/ML
INJECTION, SOLUTION INTRAMUSCULAR; INTRAVENOUS; SUBCUTANEOUS
Status: DISPENSED
Start: 2024-11-15

## (undated) RX ORDER — PROPOFOL 10 MG/ML
INJECTION, EMULSION INTRAVENOUS
Status: DISPENSED
Start: 2024-11-15

## (undated) RX ORDER — ONDANSETRON 2 MG/ML
INJECTION INTRAMUSCULAR; INTRAVENOUS
Status: DISPENSED
Start: 2024-11-15

## (undated) RX ORDER — KETOROLAC TROMETHAMINE 30 MG/ML
INJECTION, SOLUTION INTRAMUSCULAR; INTRAVENOUS
Status: DISPENSED
Start: 2024-11-15

## (undated) RX ORDER — FENTANYL CITRATE 50 UG/ML
INJECTION, SOLUTION INTRAMUSCULAR; INTRAVENOUS
Status: DISPENSED
Start: 2024-11-15